# Patient Record
Sex: FEMALE | Race: WHITE | NOT HISPANIC OR LATINO
[De-identification: names, ages, dates, MRNs, and addresses within clinical notes are randomized per-mention and may not be internally consistent; named-entity substitution may affect disease eponyms.]

---

## 2017-02-28 ENCOUNTER — APPOINTMENT (OUTPATIENT)
Dept: ORTHOPEDIC SURGERY | Facility: CLINIC | Age: 72
End: 2017-02-28

## 2017-02-28 VITALS
BODY MASS INDEX: 28 KG/M2 | DIASTOLIC BLOOD PRESSURE: 75 MMHG | WEIGHT: 164 LBS | SYSTOLIC BLOOD PRESSURE: 145 MMHG | HEIGHT: 64 IN | HEART RATE: 88 BPM

## 2017-02-28 DIAGNOSIS — Z78.9 OTHER SPECIFIED HEALTH STATUS: ICD-10-CM

## 2017-02-28 DIAGNOSIS — E78.00 PURE HYPERCHOLESTEROLEMIA, UNSPECIFIED: ICD-10-CM

## 2017-02-28 DIAGNOSIS — Z87.39 PERSONAL HISTORY OF OTHER DISEASES OF THE MUSCULOSKELETAL SYSTEM AND CONNECTIVE TISSUE: ICD-10-CM

## 2017-02-28 DIAGNOSIS — Z85.89 PERSONAL HISTORY OF MALIGNANT NEOPLASM OF OTHER ORGANS AND SYSTEMS: ICD-10-CM

## 2017-02-28 DIAGNOSIS — Z86.79 PERSONAL HISTORY OF OTHER DISEASES OF THE CIRCULATORY SYSTEM: ICD-10-CM

## 2017-02-28 DIAGNOSIS — Z85.828 PERSONAL HISTORY OF OTHER MALIGNANT NEOPLASM OF SKIN: ICD-10-CM

## 2017-02-28 DIAGNOSIS — Z82.61 FAMILY HISTORY OF ARTHRITIS: ICD-10-CM

## 2017-02-28 RX ORDER — BIMATOPROST 0.1 MG/ML
SOLUTION/ DROPS OPHTHALMIC
Refills: 0 | Status: ACTIVE | COMMUNITY

## 2017-02-28 RX ORDER — AMLODIPINE BESYLATE AND BENAZEPRIL HYDROCHLORIDE 5; 20 MG/1; MG/1
5-20 CAPSULE ORAL
Refills: 0 | Status: ACTIVE | COMMUNITY

## 2017-03-17 ENCOUNTER — APPOINTMENT (OUTPATIENT)
Dept: ORTHOPEDIC SURGERY | Facility: CLINIC | Age: 72
End: 2017-03-17

## 2017-03-17 VITALS
HEIGHT: 64 IN | DIASTOLIC BLOOD PRESSURE: 76 MMHG | SYSTOLIC BLOOD PRESSURE: 153 MMHG | HEART RATE: 83 BPM | BODY MASS INDEX: 28 KG/M2 | WEIGHT: 164 LBS

## 2017-03-28 ENCOUNTER — OUTPATIENT (OUTPATIENT)
Dept: OUTPATIENT SERVICES | Facility: HOSPITAL | Age: 72
LOS: 1 days | End: 2017-03-28
Payer: MEDICARE

## 2017-03-28 VITALS
SYSTOLIC BLOOD PRESSURE: 140 MMHG | TEMPERATURE: 97 F | HEIGHT: 63 IN | RESPIRATION RATE: 16 BRPM | HEART RATE: 84 BPM | WEIGHT: 160.06 LBS | DIASTOLIC BLOOD PRESSURE: 80 MMHG

## 2017-03-28 DIAGNOSIS — T78.40XA ALLERGY, UNSPECIFIED, INITIAL ENCOUNTER: ICD-10-CM

## 2017-03-28 DIAGNOSIS — M16.11 UNILATERAL PRIMARY OSTEOARTHRITIS, RIGHT HIP: ICD-10-CM

## 2017-03-28 DIAGNOSIS — G47.33 OBSTRUCTIVE SLEEP APNEA (ADULT) (PEDIATRIC): ICD-10-CM

## 2017-03-28 DIAGNOSIS — Z98.890 OTHER SPECIFIED POSTPROCEDURAL STATES: Chronic | ICD-10-CM

## 2017-03-28 DIAGNOSIS — I10 ESSENTIAL (PRIMARY) HYPERTENSION: ICD-10-CM

## 2017-03-28 DIAGNOSIS — Z85.828 PERSONAL HISTORY OF OTHER MALIGNANT NEOPLASM OF SKIN: Chronic | ICD-10-CM

## 2017-03-28 DIAGNOSIS — H26.9 UNSPECIFIED CATARACT: Chronic | ICD-10-CM

## 2017-03-28 LAB
APPEARANCE UR: CLEAR — SIGNIFICANT CHANGE UP
APTT BLD: 32.7 SEC — SIGNIFICANT CHANGE UP (ref 27.5–37.4)
BACTERIA # UR AUTO: HIGH
BASOPHILS # BLD AUTO: 0.02 K/UL — SIGNIFICANT CHANGE UP (ref 0–0.2)
BASOPHILS NFR BLD AUTO: 0.3 % — SIGNIFICANT CHANGE UP (ref 0–2)
BILIRUB UR-MCNC: NEGATIVE — SIGNIFICANT CHANGE UP
BLD GP AB SCN SERPL QL: NEGATIVE — SIGNIFICANT CHANGE UP
BLOOD UR QL VISUAL: HIGH
BUN SERPL-MCNC: 15 MG/DL — SIGNIFICANT CHANGE UP (ref 7–23)
CALCIUM SERPL-MCNC: 10.1 MG/DL — SIGNIFICANT CHANGE UP (ref 8.4–10.5)
CHLORIDE SERPL-SCNC: 99 MMOL/L — SIGNIFICANT CHANGE UP (ref 98–107)
CO2 SERPL-SCNC: 25 MMOL/L — SIGNIFICANT CHANGE UP (ref 22–31)
COLOR SPEC: YELLOW — SIGNIFICANT CHANGE UP
CREAT SERPL-MCNC: 0.87 MG/DL — SIGNIFICANT CHANGE UP (ref 0.5–1.3)
EOSINOPHIL # BLD AUTO: 0.08 K/UL — SIGNIFICANT CHANGE UP (ref 0–0.5)
EOSINOPHIL NFR BLD AUTO: 1.4 % — SIGNIFICANT CHANGE UP (ref 0–6)
GLUCOSE SERPL-MCNC: 91 MG/DL — SIGNIFICANT CHANGE UP (ref 70–99)
GLUCOSE UR-MCNC: NEGATIVE — SIGNIFICANT CHANGE UP
HCT VFR BLD CALC: 39.7 % — SIGNIFICANT CHANGE UP (ref 34.5–45)
HGB BLD-MCNC: 13.1 G/DL — SIGNIFICANT CHANGE UP (ref 11.5–15.5)
IMM GRANULOCYTES NFR BLD AUTO: 0.2 % — SIGNIFICANT CHANGE UP (ref 0–1.5)
INR BLD: 0.96 — SIGNIFICANT CHANGE UP (ref 0.88–1.17)
KETONES UR-MCNC: NEGATIVE — SIGNIFICANT CHANGE UP
LEUKOCYTE ESTERASE UR-ACNC: HIGH
LYMPHOCYTES # BLD AUTO: 1.67 K/UL — SIGNIFICANT CHANGE UP (ref 1–3.3)
LYMPHOCYTES # BLD AUTO: 29.1 % — SIGNIFICANT CHANGE UP (ref 13–44)
MCHC RBC-ENTMCNC: 30.4 PG — SIGNIFICANT CHANGE UP (ref 27–34)
MCHC RBC-ENTMCNC: 33 % — SIGNIFICANT CHANGE UP (ref 32–36)
MCV RBC AUTO: 92.1 FL — SIGNIFICANT CHANGE UP (ref 80–100)
MONOCYTES # BLD AUTO: 0.4 K/UL — SIGNIFICANT CHANGE UP (ref 0–0.9)
MONOCYTES NFR BLD AUTO: 7 % — SIGNIFICANT CHANGE UP (ref 2–14)
MUCOUS THREADS # UR AUTO: SIGNIFICANT CHANGE UP
NEUTROPHILS # BLD AUTO: 3.55 K/UL — SIGNIFICANT CHANGE UP (ref 1.8–7.4)
NEUTROPHILS NFR BLD AUTO: 62 % — SIGNIFICANT CHANGE UP (ref 43–77)
NITRITE UR-MCNC: POSITIVE — HIGH
NON-SQ EPI CELLS # UR AUTO: <1 — SIGNIFICANT CHANGE UP
PH UR: 5.5 — SIGNIFICANT CHANGE UP (ref 4.6–8)
PLATELET # BLD AUTO: 272 K/UL — SIGNIFICANT CHANGE UP (ref 150–400)
PMV BLD: 10.2 FL — SIGNIFICANT CHANGE UP (ref 7–13)
POTASSIUM SERPL-MCNC: 4.3 MMOL/L — SIGNIFICANT CHANGE UP (ref 3.5–5.3)
POTASSIUM SERPL-SCNC: 4.3 MMOL/L — SIGNIFICANT CHANGE UP (ref 3.5–5.3)
PROT UR-MCNC: 10 — SIGNIFICANT CHANGE UP
PROTHROM AB SERPL-ACNC: 10.7 SEC — SIGNIFICANT CHANGE UP (ref 9.8–13.1)
RBC # BLD: 4.31 M/UL — SIGNIFICANT CHANGE UP (ref 3.8–5.2)
RBC # FLD: 12.6 % — SIGNIFICANT CHANGE UP (ref 10.3–14.5)
RBC CASTS # UR COMP ASSIST: HIGH (ref 0–?)
RH IG SCN BLD-IMP: POSITIVE — SIGNIFICANT CHANGE UP
SODIUM SERPL-SCNC: 142 MMOL/L — SIGNIFICANT CHANGE UP (ref 135–145)
SP GR SPEC: 1.02 — SIGNIFICANT CHANGE UP (ref 1–1.03)
SQUAMOUS # UR AUTO: SIGNIFICANT CHANGE UP
UROBILINOGEN FLD QL: NORMAL E.U. — SIGNIFICANT CHANGE UP (ref 0.1–0.2)
WBC # BLD: 5.73 K/UL — SIGNIFICANT CHANGE UP (ref 3.8–10.5)
WBC # FLD AUTO: 5.73 K/UL — SIGNIFICANT CHANGE UP (ref 3.8–10.5)
WBC UR QL: SIGNIFICANT CHANGE UP (ref 0–?)

## 2017-03-28 PROCEDURE — 93010 ELECTROCARDIOGRAM REPORT: CPT

## 2017-03-28 RX ORDER — PANTOPRAZOLE SODIUM 20 MG/1
40 TABLET, DELAYED RELEASE ORAL ONCE
Qty: 0 | Refills: 0 | Status: COMPLETED | OUTPATIENT
Start: 2017-04-10 | End: 2017-04-10

## 2017-03-28 RX ORDER — ACETAMINOPHEN 500 MG
975 TABLET ORAL ONCE
Qty: 0 | Refills: 0 | Status: COMPLETED | OUTPATIENT
Start: 2017-04-10 | End: 2017-04-10

## 2017-03-28 RX ORDER — GABAPENTIN 400 MG/1
100 CAPSULE ORAL ONCE
Qty: 0 | Refills: 0 | Status: COMPLETED | OUTPATIENT
Start: 2017-04-10 | End: 2017-04-10

## 2017-03-28 RX ORDER — TRAMADOL HYDROCHLORIDE 50 MG/1
50 TABLET ORAL ONCE
Qty: 0 | Refills: 0 | Status: DISCONTINUED | OUTPATIENT
Start: 2017-04-10 | End: 2017-04-10

## 2017-03-28 NOTE — H&P PST ADULT - PSH
Cataract  s/p cataract surgery (2016)  History of lymph node dissection of left axilla  2012  History of skin cancer  s/p melanomas removal 2007

## 2017-03-28 NOTE — H&P PST ADULT - PMH
Cataract  s/p surgery 2016  Glaucoma    Hyperlipidemia    Hypertension    Lymph node cancer  s/p lymph node dissection & chemotherapy (2012)  Skin cancer

## 2017-03-28 NOTE — H&P PST ADULT - PROBLEM SELECTOR PLAN 2
Instructed to take amlodipine AM of surgery with a sip of water. Patient is scheduled for medical clearance. Will obtain. Instructed to take amlodipine/benazepril AM of surgery with a sip of water. Patient is scheduled for medical clearance. Will obtain.

## 2017-03-28 NOTE — H&P PST ADULT - PROBLEM SELECTOR PLAN 1
Scheduled for Scheduled for right total hip replacement direct anterior approach on 4/10/2017. labs done and results pending. Surgical scrub & preop instruction given and explained. Famotidine provided with instruction. Verbalized understanding.

## 2017-03-28 NOTE — H&P PST ADULT - NSANTHOSAYNRD_GEN_A_CORE
No. KATHARINA screening performed.  STOP BANG Legend: 0-2 = LOW Risk; 3-4 = INTERMEDIATE Risk; 5-8 = HIGH Risk

## 2017-03-28 NOTE — H&P PST ADULT - HISTORY OF PRESENT ILLNESS
72 yo female with hx of 72 yo female with hx of HTN, HLD, skin caner, lymph node cancer (s/p lymph node dissection & chemotherapy 2012) presents to have PST evaluation for right total hip replacement on. Patient c/o bilateral hip pain x years, had multiple injections to relieve pain, but stating pain got significantly worse over 4 months, went for surgical evaluation, had x-ray done, which revealed "bone on bone". 72 yo female with hx of HTN, HLD, skin caner, lymph node cancer (s/p lymph node dissection & chemotherapy 2012) presents to have PST evaluation c/o bilateral hip pain x years. Patient states, she's had multiple injections to relieve pain, but pain got significantly worse over last 4 months, went for an evaluation, had x-ray done, which revealed "bone on bone", now scheduled for right total hip replacement direct anterior approach on 4/10/2017.

## 2017-03-28 NOTE — H&P PST ADULT - NEGATIVE OPHTHALMOLOGIC SYMPTOMS
no photophobia/no lacrimation R/no diplopia/no lacrimation L/no blurred vision R/no blurred vision L

## 2017-03-28 NOTE — H&P PST ADULT - FAMILY HISTORY
Grandparent  Still living? No  Family history of diabetes mellitus, Age at diagnosis: Age Unknown     Father  Still living? No  Family history of heart disease, Age at diagnosis: Age Unknown     Mother  Still living? No  Family history of heart disease, Age at diagnosis: Age Unknown

## 2017-03-28 NOTE — H&P PST ADULT - RS GEN PE MLT RESP DETAILS PC
breath sounds equal/no rales/good air movement/clear to auscultation bilaterally/no rhonchi/no wheezes/respirations non-labored/airway patent

## 2017-03-29 LAB
SPECIMEN SOURCE: SIGNIFICANT CHANGE UP
SPECIMEN SOURCE: SIGNIFICANT CHANGE UP

## 2017-03-30 LAB — BACTERIA NPH CULT: SIGNIFICANT CHANGE UP

## 2017-04-03 ENCOUNTER — RX RENEWAL (OUTPATIENT)
Age: 72
End: 2017-04-03

## 2017-04-03 LAB
-  AMIKACIN: SIGNIFICANT CHANGE UP
-  AMPICILLIN/SULBACTAM: SIGNIFICANT CHANGE UP
-  AMPICILLIN: SIGNIFICANT CHANGE UP
-  AZTREONAM: SIGNIFICANT CHANGE UP
-  CEFAZOLIN: SIGNIFICANT CHANGE UP
-  CEFEPIME: SIGNIFICANT CHANGE UP
-  CEFOXITIN: SIGNIFICANT CHANGE UP
-  CEFTAZIDIME: SIGNIFICANT CHANGE UP
-  CEFTRIAXONE: SIGNIFICANT CHANGE UP
-  CIPROFLOXACIN: SIGNIFICANT CHANGE UP
-  ERTAPENEM: SIGNIFICANT CHANGE UP
-  GENTAMICIN: SIGNIFICANT CHANGE UP
-  IMIPENEM: SIGNIFICANT CHANGE UP
-  LEVOFLOXACIN: SIGNIFICANT CHANGE UP
-  MEROPENEM: SIGNIFICANT CHANGE UP
-  NITROFURANTOIN: SIGNIFICANT CHANGE UP
-  PIPERACILLIN/TAZOBACTAM: SIGNIFICANT CHANGE UP
-  TIGECYCLINE: SIGNIFICANT CHANGE UP
-  TOBRAMYCIN: SIGNIFICANT CHANGE UP
-  TRIMETHOPRIM/SULFAMETHOXAZOLE: SIGNIFICANT CHANGE UP
BACTERIA UR CULT: SIGNIFICANT CHANGE UP
METHOD TYPE: SIGNIFICANT CHANGE UP
ORGANISM # SPEC MICROSCOPIC CNT: SIGNIFICANT CHANGE UP
ORGANISM # SPEC MICROSCOPIC CNT: SIGNIFICANT CHANGE UP

## 2017-04-05 ENCOUNTER — OTHER (OUTPATIENT)
Age: 72
End: 2017-04-05

## 2017-04-09 ENCOUNTER — RESULT REVIEW (OUTPATIENT)
Age: 72
End: 2017-04-09

## 2017-04-10 ENCOUNTER — APPOINTMENT (OUTPATIENT)
Dept: ORTHOPEDIC SURGERY | Facility: HOSPITAL | Age: 72
End: 2017-04-10

## 2017-04-10 ENCOUNTER — INPATIENT (INPATIENT)
Facility: HOSPITAL | Age: 72
LOS: 0 days | Discharge: HOME HEALTH | End: 2017-04-11
Attending: ORTHOPAEDIC SURGERY | Admitting: ORTHOPAEDIC SURGERY
Payer: MEDICARE

## 2017-04-10 VITALS
DIASTOLIC BLOOD PRESSURE: 75 MMHG | OXYGEN SATURATION: 98 % | SYSTOLIC BLOOD PRESSURE: 126 MMHG | HEART RATE: 85 BPM | HEIGHT: 63 IN | TEMPERATURE: 98 F | RESPIRATION RATE: 16 BRPM | WEIGHT: 160.06 LBS

## 2017-04-10 DIAGNOSIS — H26.9 UNSPECIFIED CATARACT: Chronic | ICD-10-CM

## 2017-04-10 DIAGNOSIS — M16.11 UNILATERAL PRIMARY OSTEOARTHRITIS, RIGHT HIP: ICD-10-CM

## 2017-04-10 DIAGNOSIS — Z85.828 PERSONAL HISTORY OF OTHER MALIGNANT NEOPLASM OF SKIN: Chronic | ICD-10-CM

## 2017-04-10 DIAGNOSIS — Z98.890 OTHER SPECIFIED POSTPROCEDURAL STATES: Chronic | ICD-10-CM

## 2017-04-10 LAB
BUN SERPL-MCNC: 17 MG/DL — SIGNIFICANT CHANGE UP (ref 7–23)
CALCIUM SERPL-MCNC: 9.5 MG/DL — SIGNIFICANT CHANGE UP (ref 8.4–10.5)
CHLORIDE SERPL-SCNC: 102 MMOL/L — SIGNIFICANT CHANGE UP (ref 98–107)
CO2 SERPL-SCNC: 24 MMOL/L — SIGNIFICANT CHANGE UP (ref 22–31)
CREAT SERPL-MCNC: 0.85 MG/DL — SIGNIFICANT CHANGE UP (ref 0.5–1.3)
GLUCOSE SERPL-MCNC: 142 MG/DL — HIGH (ref 70–99)
HCT VFR BLD CALC: 35.7 % — SIGNIFICANT CHANGE UP (ref 34.5–45)
HGB BLD-MCNC: 11.7 G/DL — SIGNIFICANT CHANGE UP (ref 11.5–15.5)
MCHC RBC-ENTMCNC: 29.8 PG — SIGNIFICANT CHANGE UP (ref 27–34)
MCHC RBC-ENTMCNC: 32.8 % — SIGNIFICANT CHANGE UP (ref 32–36)
MCV RBC AUTO: 91.1 FL — SIGNIFICANT CHANGE UP (ref 80–100)
PLATELET # BLD AUTO: 194 K/UL — SIGNIFICANT CHANGE UP (ref 150–400)
PMV BLD: 9.1 FL — SIGNIFICANT CHANGE UP (ref 7–13)
POTASSIUM SERPL-MCNC: 4.4 MMOL/L — SIGNIFICANT CHANGE UP (ref 3.5–5.3)
POTASSIUM SERPL-SCNC: 4.4 MMOL/L — SIGNIFICANT CHANGE UP (ref 3.5–5.3)
RBC # BLD: 3.92 M/UL — SIGNIFICANT CHANGE UP (ref 3.8–5.2)
RBC # FLD: 12.9 % — SIGNIFICANT CHANGE UP (ref 10.3–14.5)
RH IG SCN BLD-IMP: POSITIVE — SIGNIFICANT CHANGE UP
SODIUM SERPL-SCNC: 141 MMOL/L — SIGNIFICANT CHANGE UP (ref 135–145)
WBC # BLD: 8.48 K/UL — SIGNIFICANT CHANGE UP (ref 3.8–10.5)
WBC # FLD AUTO: 8.48 K/UL — SIGNIFICANT CHANGE UP (ref 3.8–10.5)

## 2017-04-10 PROCEDURE — 27130 TOTAL HIP ARTHROPLASTY: CPT | Mod: RT

## 2017-04-10 PROCEDURE — 88304 TISSUE EXAM BY PATHOLOGIST: CPT | Mod: 26

## 2017-04-10 PROCEDURE — 88311 DECALCIFY TISSUE: CPT | Mod: 26

## 2017-04-10 PROCEDURE — 73501 X-RAY EXAM HIP UNI 1 VIEW: CPT | Mod: 26,RT

## 2017-04-10 RX ORDER — SENNA PLUS 8.6 MG/1
2 TABLET ORAL AT BEDTIME
Qty: 0 | Refills: 0 | Status: DISCONTINUED | OUTPATIENT
Start: 2017-04-10 | End: 2017-04-11

## 2017-04-10 RX ORDER — SODIUM CHLORIDE 9 MG/ML
1000 INJECTION, SOLUTION INTRAVENOUS
Qty: 0 | Refills: 0 | Status: DISCONTINUED | OUTPATIENT
Start: 2017-04-10 | End: 2017-04-11

## 2017-04-10 RX ORDER — POLYETHYLENE GLYCOL 3350 17 G/17G
17 POWDER, FOR SOLUTION ORAL DAILY
Qty: 0 | Refills: 0 | Status: DISCONTINUED | OUTPATIENT
Start: 2017-04-10 | End: 2017-04-11

## 2017-04-10 RX ORDER — OXYCODONE HYDROCHLORIDE 5 MG/1
5 TABLET ORAL EVERY 4 HOURS
Qty: 0 | Refills: 0 | Status: DISCONTINUED | OUTPATIENT
Start: 2017-04-10 | End: 2017-04-11

## 2017-04-10 RX ORDER — DOCUSATE SODIUM 100 MG
100 CAPSULE ORAL THREE TIMES A DAY
Qty: 0 | Refills: 0 | Status: DISCONTINUED | OUTPATIENT
Start: 2017-04-10 | End: 2017-04-11

## 2017-04-10 RX ORDER — SODIUM CHLORIDE 9 MG/ML
1000 INJECTION INTRAMUSCULAR; INTRAVENOUS; SUBCUTANEOUS ONCE
Qty: 0 | Refills: 0 | Status: COMPLETED | OUTPATIENT
Start: 2017-04-11 | End: 2017-04-11

## 2017-04-10 RX ORDER — DEXAMETHASONE 0.5 MG/5ML
10 ELIXIR ORAL ONCE
Qty: 0 | Refills: 0 | Status: COMPLETED | OUTPATIENT
Start: 2017-04-11 | End: 2017-04-11

## 2017-04-10 RX ORDER — CELECOXIB 200 MG/1
200 CAPSULE ORAL
Qty: 0 | Refills: 0 | Status: DISCONTINUED | OUTPATIENT
Start: 2017-04-12 | End: 2017-04-11

## 2017-04-10 RX ORDER — SODIUM CHLORIDE 9 MG/ML
3 INJECTION INTRAMUSCULAR; INTRAVENOUS; SUBCUTANEOUS EVERY 8 HOURS
Qty: 0 | Refills: 0 | Status: DISCONTINUED | OUTPATIENT
Start: 2017-04-10 | End: 2017-04-10

## 2017-04-10 RX ORDER — LATANOPROST 0.05 MG/ML
1 SOLUTION/ DROPS OPHTHALMIC; TOPICAL AT BEDTIME
Qty: 0 | Refills: 0 | Status: DISCONTINUED | OUTPATIENT
Start: 2017-04-10 | End: 2017-04-11

## 2017-04-10 RX ORDER — OXYCODONE HYDROCHLORIDE 5 MG/1
10 TABLET ORAL EVERY 4 HOURS
Qty: 0 | Refills: 0 | Status: DISCONTINUED | OUTPATIENT
Start: 2017-04-10 | End: 2017-04-11

## 2017-04-10 RX ORDER — ASPIRIN/CALCIUM CARB/MAGNESIUM 324 MG
325 TABLET ORAL
Qty: 0 | Refills: 0 | Status: DISCONTINUED | OUTPATIENT
Start: 2017-04-10 | End: 2017-04-11

## 2017-04-10 RX ORDER — ACETAMINOPHEN 500 MG
650 TABLET ORAL EVERY 6 HOURS
Qty: 0 | Refills: 0 | Status: DISCONTINUED | OUTPATIENT
Start: 2017-04-10 | End: 2017-04-11

## 2017-04-10 RX ORDER — SODIUM CHLORIDE 0.65 %
1 AEROSOL, SPRAY (ML) NASAL
Qty: 0 | Refills: 0 | Status: DISCONTINUED | OUTPATIENT
Start: 2017-04-10 | End: 2017-04-11

## 2017-04-10 RX ORDER — CEFAZOLIN SODIUM 1 G
2000 VIAL (EA) INJECTION EVERY 8 HOURS
Qty: 0 | Refills: 0 | Status: COMPLETED | OUTPATIENT
Start: 2017-04-10 | End: 2017-04-10

## 2017-04-10 RX ORDER — LISINOPRIL 2.5 MG/1
20 TABLET ORAL DAILY
Qty: 0 | Refills: 0 | Status: DISCONTINUED | OUTPATIENT
Start: 2017-04-10 | End: 2017-04-11

## 2017-04-10 RX ORDER — SODIUM CHLORIDE 9 MG/ML
1000 INJECTION, SOLUTION INTRAVENOUS
Qty: 0 | Refills: 0 | Status: DISCONTINUED | OUTPATIENT
Start: 2017-04-10 | End: 2017-04-10

## 2017-04-10 RX ORDER — ONDANSETRON 8 MG/1
4 TABLET, FILM COATED ORAL EVERY 6 HOURS
Qty: 0 | Refills: 0 | Status: DISCONTINUED | OUTPATIENT
Start: 2017-04-10 | End: 2017-04-11

## 2017-04-10 RX ORDER — ATORVASTATIN CALCIUM 80 MG/1
10 TABLET, FILM COATED ORAL AT BEDTIME
Qty: 0 | Refills: 0 | Status: DISCONTINUED | OUTPATIENT
Start: 2017-04-10 | End: 2017-04-11

## 2017-04-10 RX ORDER — AMLODIPINE BESYLATE 2.5 MG/1
5 TABLET ORAL DAILY
Qty: 0 | Refills: 0 | Status: DISCONTINUED | OUTPATIENT
Start: 2017-04-10 | End: 2017-04-11

## 2017-04-10 RX ORDER — TRAMADOL HYDROCHLORIDE 50 MG/1
50 TABLET ORAL EVERY 8 HOURS
Qty: 0 | Refills: 0 | Status: DISCONTINUED | OUTPATIENT
Start: 2017-04-10 | End: 2017-04-11

## 2017-04-10 RX ORDER — PANTOPRAZOLE SODIUM 20 MG/1
40 TABLET, DELAYED RELEASE ORAL DAILY
Qty: 0 | Refills: 0 | Status: DISCONTINUED | OUTPATIENT
Start: 2017-04-10 | End: 2017-04-11

## 2017-04-10 RX ORDER — HYDROMORPHONE HYDROCHLORIDE 2 MG/ML
0.5 INJECTION INTRAMUSCULAR; INTRAVENOUS; SUBCUTANEOUS EVERY 4 HOURS
Qty: 0 | Refills: 0 | Status: DISCONTINUED | OUTPATIENT
Start: 2017-04-10 | End: 2017-04-11

## 2017-04-10 RX ORDER — SODIUM CHLORIDE 9 MG/ML
1000 INJECTION INTRAMUSCULAR; INTRAVENOUS; SUBCUTANEOUS ONCE
Qty: 0 | Refills: 0 | Status: COMPLETED | OUTPATIENT
Start: 2017-04-10 | End: 2017-04-10

## 2017-04-10 RX ORDER — KETOROLAC TROMETHAMINE 30 MG/ML
15 SYRINGE (ML) INJECTION EVERY 8 HOURS
Qty: 0 | Refills: 0 | Status: DISCONTINUED | OUTPATIENT
Start: 2017-04-10 | End: 2017-04-11

## 2017-04-10 RX ADMIN — Medication 325 MILLIGRAM(S): at 19:39

## 2017-04-10 RX ADMIN — SENNA PLUS 2 TABLET(S): 8.6 TABLET ORAL at 22:16

## 2017-04-10 RX ADMIN — Medication 650 MILLIGRAM(S): at 19:39

## 2017-04-10 RX ADMIN — GABAPENTIN 100 MILLIGRAM(S): 400 CAPSULE ORAL at 06:19

## 2017-04-10 RX ADMIN — TRAMADOL HYDROCHLORIDE 50 MILLIGRAM(S): 50 TABLET ORAL at 22:16

## 2017-04-10 RX ADMIN — TRAMADOL HYDROCHLORIDE 50 MILLIGRAM(S): 50 TABLET ORAL at 06:22

## 2017-04-10 RX ADMIN — PANTOPRAZOLE SODIUM 40 MILLIGRAM(S): 20 TABLET, DELAYED RELEASE ORAL at 06:20

## 2017-04-10 RX ADMIN — Medication 100 MILLIGRAM(S): at 16:53

## 2017-04-10 RX ADMIN — SODIUM CHLORIDE 1000 MILLILITER(S): 9 INJECTION INTRAMUSCULAR; INTRAVENOUS; SUBCUTANEOUS at 13:34

## 2017-04-10 RX ADMIN — Medication 100 MILLIGRAM(S): at 22:16

## 2017-04-10 RX ADMIN — ONDANSETRON 4 MILLIGRAM(S): 8 TABLET, FILM COATED ORAL at 17:53

## 2017-04-10 RX ADMIN — Medication 15 MILLIGRAM(S): at 22:16

## 2017-04-10 RX ADMIN — Medication 15 MILLIGRAM(S): at 14:10

## 2017-04-10 RX ADMIN — TRAMADOL HYDROCHLORIDE 50 MILLIGRAM(S): 50 TABLET ORAL at 14:09

## 2017-04-10 RX ADMIN — Medication 100 MILLIGRAM(S): at 14:09

## 2017-04-10 RX ADMIN — Medication 100 MILLIGRAM(S): at 23:15

## 2017-04-10 RX ADMIN — Medication 975 MILLIGRAM(S): at 06:21

## 2017-04-10 RX ADMIN — POLYETHYLENE GLYCOL 3350 17 GRAM(S): 17 POWDER, FOR SOLUTION ORAL at 14:12

## 2017-04-10 RX ADMIN — SODIUM CHLORIDE 150 MILLILITER(S): 9 INJECTION, SOLUTION INTRAVENOUS at 10:38

## 2017-04-10 RX ADMIN — Medication 650 MILLIGRAM(S): at 12:20

## 2017-04-10 RX ADMIN — Medication 650 MILLIGRAM(S): at 23:18

## 2017-04-10 NOTE — PHYSICAL THERAPY INITIAL EVALUATION ADULT - ACTIVE RANGE OF MOTION EXAMINATION, REHAB EVAL
bilateral  lower extremity Active ROM was WFL (within functional limits)/bilateral upper extremity Active ROM was WFL (within functional limits)/except R hip flexion 0- 45 degrees

## 2017-04-10 NOTE — PACU DISCHARGE NOTE - COMMENTS
Transfer patient to post surgical floor on continuous pulse oximetry monitoring after meeting PACU criteria.

## 2017-04-10 NOTE — ASU PREOP CHECKLIST - COMMENTS
Sip of water at 4:30 am with amlodipine and atorvasatin and pepcid Sip of water at 4:30 am with amlodipine and atorvastatin and pepcid Sip of water at 4:30 am with amlodipine and atorvastatin and Pepcid

## 2017-04-10 NOTE — ASU PREOP CHECKLIST - SELECT TESTS ORDERED
CBC/Type and Screen/Type and Cross/INR/EKG/CMP/PT/PTT PT/PTT/Type and Screen/CBC/Type and Cross/EKG/nasal swab negative/CMP/INR

## 2017-04-11 ENCOUNTER — TRANSCRIPTION ENCOUNTER (OUTPATIENT)
Age: 72
End: 2017-04-11

## 2017-04-11 VITALS
OXYGEN SATURATION: 98 % | DIASTOLIC BLOOD PRESSURE: 74 MMHG | SYSTOLIC BLOOD PRESSURE: 151 MMHG | TEMPERATURE: 99 F | RESPIRATION RATE: 17 BRPM | HEART RATE: 82 BPM

## 2017-04-11 LAB
BUN SERPL-MCNC: 14 MG/DL — SIGNIFICANT CHANGE UP (ref 7–23)
CALCIUM SERPL-MCNC: 7.9 MG/DL — LOW (ref 8.4–10.5)
CHLORIDE SERPL-SCNC: 107 MMOL/L — SIGNIFICANT CHANGE UP (ref 98–107)
CO2 SERPL-SCNC: 21 MMOL/L — LOW (ref 22–31)
CREAT SERPL-MCNC: 0.68 MG/DL — SIGNIFICANT CHANGE UP (ref 0.5–1.3)
GLUCOSE SERPL-MCNC: 152 MG/DL — HIGH (ref 70–99)
HCT VFR BLD CALC: 28.3 % — LOW (ref 34.5–45)
HGB BLD-MCNC: 9.6 G/DL — LOW (ref 11.5–15.5)
MCHC RBC-ENTMCNC: 30.5 PG — SIGNIFICANT CHANGE UP (ref 27–34)
MCHC RBC-ENTMCNC: 33.9 % — SIGNIFICANT CHANGE UP (ref 32–36)
MCV RBC AUTO: 89.8 FL — SIGNIFICANT CHANGE UP (ref 80–100)
PLATELET # BLD AUTO: 177 K/UL — SIGNIFICANT CHANGE UP (ref 150–400)
PMV BLD: 9.3 FL — SIGNIFICANT CHANGE UP (ref 7–13)
POTASSIUM SERPL-MCNC: 4.1 MMOL/L — SIGNIFICANT CHANGE UP (ref 3.5–5.3)
POTASSIUM SERPL-SCNC: 4.1 MMOL/L — SIGNIFICANT CHANGE UP (ref 3.5–5.3)
RBC # BLD: 3.15 M/UL — LOW (ref 3.8–5.2)
RBC # FLD: 12.9 % — SIGNIFICANT CHANGE UP (ref 10.3–14.5)
SODIUM SERPL-SCNC: 142 MMOL/L — SIGNIFICANT CHANGE UP (ref 135–145)
WBC # BLD: 8.13 K/UL — SIGNIFICANT CHANGE UP (ref 3.8–10.5)
WBC # FLD AUTO: 8.13 K/UL — SIGNIFICANT CHANGE UP (ref 3.8–10.5)

## 2017-04-11 PROCEDURE — 99223 1ST HOSP IP/OBS HIGH 75: CPT | Mod: AI

## 2017-04-11 RX ORDER — ASPIRIN/CALCIUM CARB/MAGNESIUM 324 MG
1 TABLET ORAL
Qty: 90 | Refills: 0 | OUTPATIENT
Start: 2017-04-11 | End: 2017-05-26

## 2017-04-11 RX ORDER — TRAMADOL HYDROCHLORIDE 50 MG/1
0 TABLET ORAL
Qty: 0 | Refills: 0 | COMMUNITY

## 2017-04-11 RX ORDER — SENNA PLUS 8.6 MG/1
2 TABLET ORAL
Qty: 10 | Refills: 0 | OUTPATIENT
Start: 2017-04-11 | End: 2017-04-16

## 2017-04-11 RX ORDER — BACITRACIN ZINC 500 UNIT/G
1 OINTMENT IN PACKET (EA) TOPICAL ONCE
Qty: 0 | Refills: 0 | Status: COMPLETED | OUTPATIENT
Start: 2017-04-11 | End: 2017-04-11

## 2017-04-11 RX ORDER — ACETAMINOPHEN 500 MG
0 TABLET ORAL
Qty: 0 | Refills: 0 | COMMUNITY

## 2017-04-11 RX ORDER — TRAMADOL HYDROCHLORIDE 50 MG/1
1 TABLET ORAL
Qty: 21 | Refills: 0 | OUTPATIENT
Start: 2017-04-11 | End: 2017-04-18

## 2017-04-11 RX ORDER — DOCUSATE SODIUM 100 MG
1 CAPSULE ORAL
Qty: 0 | Refills: 0 | COMMUNITY
Start: 2017-04-11

## 2017-04-11 RX ADMIN — SODIUM CHLORIDE 150 MILLILITER(S): 9 INJECTION, SOLUTION INTRAVENOUS at 06:39

## 2017-04-11 RX ADMIN — Medication 650 MILLIGRAM(S): at 11:52

## 2017-04-11 RX ADMIN — Medication 102 MILLIGRAM(S): at 04:12

## 2017-04-11 RX ADMIN — Medication 325 MILLIGRAM(S): at 17:38

## 2017-04-11 RX ADMIN — SODIUM CHLORIDE 1000 MILLILITER(S): 9 INJECTION INTRAMUSCULAR; INTRAVENOUS; SUBCUTANEOUS at 05:49

## 2017-04-11 RX ADMIN — TRAMADOL HYDROCHLORIDE 50 MILLIGRAM(S): 50 TABLET ORAL at 13:49

## 2017-04-11 RX ADMIN — PANTOPRAZOLE SODIUM 40 MILLIGRAM(S): 20 TABLET, DELAYED RELEASE ORAL at 11:51

## 2017-04-11 RX ADMIN — Medication 15 MILLIGRAM(S): at 05:48

## 2017-04-11 RX ADMIN — Medication 100 MILLIGRAM(S): at 13:49

## 2017-04-11 RX ADMIN — Medication 1 APPLICATION(S): at 17:07

## 2017-04-11 RX ADMIN — OXYCODONE HYDROCHLORIDE 10 MILLIGRAM(S): 5 TABLET ORAL at 09:31

## 2017-04-11 RX ADMIN — LISINOPRIL 20 MILLIGRAM(S): 2.5 TABLET ORAL at 05:49

## 2017-04-11 RX ADMIN — Medication 325 MILLIGRAM(S): at 05:48

## 2017-04-11 RX ADMIN — OXYCODONE HYDROCHLORIDE 10 MILLIGRAM(S): 5 TABLET ORAL at 10:14

## 2017-04-11 RX ADMIN — Medication 100 MILLIGRAM(S): at 05:49

## 2017-04-11 RX ADMIN — AMLODIPINE BESYLATE 5 MILLIGRAM(S): 2.5 TABLET ORAL at 05:48

## 2017-04-11 RX ADMIN — Medication 15 MILLIGRAM(S): at 05:49

## 2017-04-11 RX ADMIN — POLYETHYLENE GLYCOL 3350 17 GRAM(S): 17 POWDER, FOR SOLUTION ORAL at 11:51

## 2017-04-11 RX ADMIN — TRAMADOL HYDROCHLORIDE 50 MILLIGRAM(S): 50 TABLET ORAL at 05:48

## 2017-04-11 RX ADMIN — OXYCODONE HYDROCHLORIDE 10 MILLIGRAM(S): 5 TABLET ORAL at 17:09

## 2017-04-11 RX ADMIN — Medication 650 MILLIGRAM(S): at 17:39

## 2017-04-11 RX ADMIN — ATORVASTATIN CALCIUM 10 MILLIGRAM(S): 80 TABLET, FILM COATED ORAL at 05:48

## 2017-04-11 RX ADMIN — Medication 650 MILLIGRAM(S): at 05:48

## 2017-04-11 RX ADMIN — OXYCODONE HYDROCHLORIDE 10 MILLIGRAM(S): 5 TABLET ORAL at 18:03

## 2017-04-11 NOTE — OCCUPATIONAL THERAPY INITIAL EVALUATION ADULT - PERTINENT HX OF CURRENT PROBLEM, REHAB EVAL
Pt is a 71 year old female with hx of HTN, HLD, skin caner, & lymph node cancer (s/p lymph node dissection & chemotherapy 2012) who presents with c/o bilateral hip pain x years. Patient states, she's had multiple injections to relieve pain, but pain got significantly worse over last 4 months. Pt had x-ray done, which revealed "bone on bone.” Pt is now s/p right total hip replacement direct anterior approach on 4/10/2017.

## 2017-04-11 NOTE — DISCHARGE NOTE ADULT - PLAN OF CARE
Pain reduction, improve ambulation and ADLs Patient is a 70 y/o female s/p right hip arthroplasty on 4/10/17. Patient tolerated the procedure well without any intraoperative complications. Patient states pain is controlled. Tolerated Physical Therapy well. Patient seen by medical attending - hospitalist during admission for continued care and management. As per Dr Mckenna patient is stable and ready for discharge.     Please follow up with Dr Mckenna in 1-2 weeks. Call office to make an appointment. Please follow up with your PMD for continuity of care and management. Please keep Aquacel dressing in place until post op day # 14. Any sutures/staples to be removed on post op day # 14.  Please take aspirin twice daily for DVT Prophylaxis. Weight bearing as tolerated. Call orthopaedics with any questions. Please use total hip precautions.

## 2017-04-11 NOTE — DISCHARGE NOTE ADULT - INSTRUCTIONS
may resume diet as prior to surgery You have a postop appointmentwith Dr Mckenna on 4/28/2017 @ 8:45 AM in the [place] office, please keep  aguacell dressing on until this appointment.  Notify Dr Mckenna if you experience increase in pain not relieved with pain medication, any redness, drainage or swelling around incision, or if you develop a fever >101.0.  Continue to do exercises as instructed, use over the counter stool softeners to assist with constipation that can be a side effect of your pain medication.  Maintain hip precautions.

## 2017-04-11 NOTE — DISCHARGE NOTE ADULT - NS AS DC FOLLOWUP STROKE INST
carenotes on surgical procedure, exercise program hip precautions, and d/c medications/Smoking Cessation

## 2017-04-11 NOTE — OCCUPATIONAL THERAPY INITIAL EVALUATION ADULT - NS ASR BATHING EQUIP NEEDS
Pt. to be educated on benefits and how to privately purchase during upcoming ADL session. Pt reports she already owns Clamp-on tub rail./shower chair

## 2017-04-11 NOTE — DISCHARGE NOTE ADULT - MEDICATION SUMMARY - MEDICATIONS TO STOP TAKING
I will STOP taking the medications listed below when I get home from the hospital:    Tylenol Arthritis Caplet  --   650mg 2 tabs  2 x daily

## 2017-04-11 NOTE — OCCUPATIONAL THERAPY INITIAL EVALUATION ADULT - LIVES WITH, PROFILE
Pt. reports she lives with her  in a house with 5 steps to enter. Once inside, pt. reports she has no steps to negotiate and bedroom and bathroom are located on the main level. Per pt., she has a bathtub in her bathroom with Clamp-on Tub rail available.

## 2017-04-11 NOTE — DISCHARGE NOTE ADULT - CONDITIONS AT DISCHARGE
stable, right hip aquacel dressing in place clean, dry and intact, tolerating diet, oob with walker voiding well, tolerating diet.

## 2017-04-11 NOTE — DISCHARGE NOTE ADULT - MEDICATION SUMMARY - MEDICATIONS TO TAKE
I will START or STAY ON the medications listed below when I get home from the hospital:    traMADol 50 mg oral tablet  -- 1 tab(s) by mouth every 8 hours MDD:3  -- Indication: For Pain med     Percocet 5/325 oral tablet  -- 1-2 tab(s) by mouth every 6 hours PRN Pain MDD:8  -- Indication: For Pain med     aspirin 325 mg oral delayed release tablet  -- 1 tab(s) by mouth 2 times a day MDD:2  -- Indication: For DVT Prophylaxis    atorvastatin 10 mg oral tablet  -- 1 tab(s) by mouth once a day  -- Indication: For HLD    amLODIPine-benazepril 5 mg-20 mg oral capsule  -- 1 cap(s) by mouth once a day  -- Indication: For HTN     docusate sodium 100 mg oral capsule  -- 1 cap(s) by mouth 3 times a day  -- Indication: For Stool softener     senna oral tablet  -- 2 tab(s) by mouth once a day (at bedtime) MDD:2  -- Indication: For Stool softener     Lumigan 0.01% ophthalmic solution  -- 1 drop(s) to each affected eye once a day (in the evening)  -- Indication: For Home med

## 2017-04-11 NOTE — DISCHARGE NOTE ADULT - HOME CARE AGENCY
[facility] - [phone]  Nurse to visit the day after hospital discharge; physical therapist to follow. Please contact the home care agency at the above phone number if you have not heard from them by 12 noon on the day after your hospital discharge.

## 2017-04-11 NOTE — DISCHARGE NOTE ADULT - CARE PLAN
Principal Discharge DX:	Unilateral primary osteoarthritis, right hip  Goal:	Pain reduction, improve ambulation and ADLs  Instructions for follow-up, activity and diet:	Patient is a 70 y/o female s/p right hip arthroplasty on 4/10/17. Patient tolerated the procedure well without any intraoperative complications. Patient states pain is controlled. Tolerated Physical Therapy well. Patient seen by medical attending - hospitalist during admission for continued care and management. As per Dr Mckenna patient is stable and ready for discharge.     Please follow up with Dr Mckenna in 1-2 weeks. Call office to make an appointment. Please follow up with your PMD for continuity of care and management. Please keep Aquacel dressing in place until post op day # 14. Any sutures/staples to be removed on post op day # 14.  Please take aspirin twice daily for DVT Prophylaxis. Weight bearing as tolerated. Call orthopaedics with any questions. Please use total hip precautions. Principal Discharge DX:	Unilateral primary osteoarthritis, right hip  Goal:	Pain reduction, improve ambulation and ADLs  Instructions for follow-up, activity and diet:	Patient is a 72 y/o female s/p right hip arthroplasty on 4/10/17. Patient tolerated the procedure well without any intraoperative complications. Patient states pain is controlled. Tolerated Physical Therapy well. Patient seen by medical attending - hospitalist during admission for continued care and management. As per Dr Mckenna patient is stable and ready for discharge.     Please follow up with Dr Mckenna in 1-2 weeks. Call office to make an appointment. Please follow up with your PMD for continuity of care and management. Please keep Aquacel dressing in place until post op day # 14. Any sutures/staples to be removed on post op day # 14.  Please take aspirin twice daily for DVT Prophylaxis. Weight bearing as tolerated. Call orthopaedics with any questions. Please use total hip precautions.

## 2017-04-11 NOTE — OCCUPATIONAL THERAPY INITIAL EVALUATION ADULT - GENERAL OBSERVATIONS, REHAB EVAL
Pt. received sitting in Hip Chair in therapy gym with PT Assistant present. NAD. +C/D/I Dressing to Right Hip, +Heplock.

## 2017-04-11 NOTE — DISCHARGE NOTE ADULT - CARE PROVIDERS DIRECT ADDRESSES
[email or web address]

## 2017-04-11 NOTE — DISCHARGE NOTE ADULT - HOSPITAL COURSE
Patient is a 70 y/o female s/p right hip arthroplasty on 4/10/17. Patient tolerated the procedure well without any intraoperative complications. Patient states pain is controlled. Tolerated Physical Therapy well. Patient seen by medical attending - hospitalist during admission for continued care and management. As per Dr Mckenna patient is stable and ready for discharge.     Please follow up with Dr Mckenna in 1-2 weeks. Call office to make an appointment. Please follow up with your PMD for continuity of care and management. Please keep Aquacel dressing in place until post op day # 14. Any sutures/staples to be removed on post op day # 14.  Please take aspirin twice daily for DVT Prophylaxis. Weight bearing as tolerated. Call orthopaedics with any questions. Please use total hip precautions.

## 2017-04-11 NOTE — DISCHARGE NOTE ADULT - PATIENT PORTAL LINK FT
“You can access the FollowHealth Patient Portal, offered by [facility], by registering with the following website: [email or web address]”

## 2017-04-11 NOTE — DISCHARGE NOTE ADULT - CARE PROVIDER_API CALL
Emiliano Mckenna), Orthopaedic Surgery  [address] [place] [address]  Phone: [phone]  Fax: [phone]

## 2017-04-17 ENCOUNTER — TRANSCRIPTION ENCOUNTER (OUTPATIENT)
Age: 72
End: 2017-04-17

## 2017-04-17 LAB — SURGICAL PATHOLOGY STUDY: SIGNIFICANT CHANGE UP

## 2017-04-19 ENCOUNTER — RX RENEWAL (OUTPATIENT)
Age: 72
End: 2017-04-19

## 2017-04-28 ENCOUNTER — APPOINTMENT (OUTPATIENT)
Dept: ORTHOPEDIC SURGERY | Facility: CLINIC | Age: 72
End: 2017-04-28

## 2017-04-28 VITALS
DIASTOLIC BLOOD PRESSURE: 69 MMHG | SYSTOLIC BLOOD PRESSURE: 116 MMHG | HEART RATE: 98 BPM | HEIGHT: 64 IN | BODY MASS INDEX: 28 KG/M2 | WEIGHT: 164 LBS

## 2017-06-09 ENCOUNTER — APPOINTMENT (OUTPATIENT)
Dept: ORTHOPEDIC SURGERY | Facility: CLINIC | Age: 72
End: 2017-06-09

## 2017-06-09 VITALS
HEART RATE: 94 BPM | DIASTOLIC BLOOD PRESSURE: 75 MMHG | BODY MASS INDEX: 28 KG/M2 | WEIGHT: 164 LBS | SYSTOLIC BLOOD PRESSURE: 128 MMHG | HEIGHT: 64 IN

## 2017-08-25 ENCOUNTER — APPOINTMENT (OUTPATIENT)
Dept: ORTHOPEDIC SURGERY | Facility: CLINIC | Age: 72
End: 2017-08-25
Payer: MEDICARE

## 2017-08-25 VITALS
HEIGHT: 64 IN | WEIGHT: 164 LBS | DIASTOLIC BLOOD PRESSURE: 79 MMHG | SYSTOLIC BLOOD PRESSURE: 138 MMHG | HEART RATE: 81 BPM | BODY MASS INDEX: 28 KG/M2

## 2017-08-25 DIAGNOSIS — M16.12 UNILATERAL PRIMARY OSTEOARTHRITIS, LEFT HIP: ICD-10-CM

## 2017-08-25 PROCEDURE — 99214 OFFICE O/P EST MOD 30 MIN: CPT

## 2017-08-25 RX ORDER — ATORVASTATIN CALCIUM 10 MG/1
10 TABLET, FILM COATED ORAL
Qty: 90 | Refills: 0 | Status: ACTIVE | COMMUNITY
Start: 2016-11-28

## 2017-08-29 ENCOUNTER — OUTPATIENT (OUTPATIENT)
Dept: OUTPATIENT SERVICES | Facility: HOSPITAL | Age: 72
LOS: 1 days | End: 2017-08-29
Payer: MEDICARE

## 2017-08-29 VITALS
WEIGHT: 164.02 LBS | DIASTOLIC BLOOD PRESSURE: 78 MMHG | HEIGHT: 64 IN | HEART RATE: 85 BPM | RESPIRATION RATE: 16 BRPM | TEMPERATURE: 98 F | SYSTOLIC BLOOD PRESSURE: 132 MMHG | OXYGEN SATURATION: 97 %

## 2017-08-29 DIAGNOSIS — Z85.828 PERSONAL HISTORY OF OTHER MALIGNANT NEOPLASM OF SKIN: Chronic | ICD-10-CM

## 2017-08-29 DIAGNOSIS — M16.12 UNILATERAL PRIMARY OSTEOARTHRITIS, LEFT HIP: ICD-10-CM

## 2017-08-29 DIAGNOSIS — H26.9 UNSPECIFIED CATARACT: Chronic | ICD-10-CM

## 2017-08-29 DIAGNOSIS — Z98.890 OTHER SPECIFIED POSTPROCEDURAL STATES: Chronic | ICD-10-CM

## 2017-08-29 DIAGNOSIS — Z96.649 PRESENCE OF UNSPECIFIED ARTIFICIAL HIP JOINT: Chronic | ICD-10-CM

## 2017-08-29 DIAGNOSIS — L05.91 PILONIDAL CYST WITHOUT ABSCESS: Chronic | ICD-10-CM

## 2017-08-29 DIAGNOSIS — I10 ESSENTIAL (PRIMARY) HYPERTENSION: ICD-10-CM

## 2017-08-29 LAB
APPEARANCE UR: CLEAR — SIGNIFICANT CHANGE UP
APTT BLD: 30.3 SEC — SIGNIFICANT CHANGE UP (ref 27.5–37.4)
BILIRUB UR-MCNC: NEGATIVE — SIGNIFICANT CHANGE UP
BLD GP AB SCN SERPL QL: NEGATIVE — SIGNIFICANT CHANGE UP
BLOOD UR QL VISUAL: NEGATIVE — SIGNIFICANT CHANGE UP
BUN SERPL-MCNC: 16 MG/DL — SIGNIFICANT CHANGE UP (ref 7–23)
CALCIUM SERPL-MCNC: 10 MG/DL — SIGNIFICANT CHANGE UP (ref 8.4–10.5)
CHLORIDE SERPL-SCNC: 101 MMOL/L — SIGNIFICANT CHANGE UP (ref 98–107)
CO2 SERPL-SCNC: 25 MMOL/L — SIGNIFICANT CHANGE UP (ref 22–31)
COLOR SPEC: SIGNIFICANT CHANGE UP
CREAT SERPL-MCNC: 0.9 MG/DL — SIGNIFICANT CHANGE UP (ref 0.5–1.3)
GLUCOSE SERPL-MCNC: 136 MG/DL — HIGH (ref 70–99)
GLUCOSE UR-MCNC: NEGATIVE — SIGNIFICANT CHANGE UP
HCT VFR BLD CALC: 38.5 % — SIGNIFICANT CHANGE UP (ref 34.5–45)
HGB BLD-MCNC: 12.8 G/DL — SIGNIFICANT CHANGE UP (ref 11.5–15.5)
INR BLD: 0.94 — SIGNIFICANT CHANGE UP (ref 0.88–1.17)
KETONES UR-MCNC: NEGATIVE — SIGNIFICANT CHANGE UP
LEUKOCYTE ESTERASE UR-ACNC: NEGATIVE — SIGNIFICANT CHANGE UP
MCHC RBC-ENTMCNC: 29.8 PG — SIGNIFICANT CHANGE UP (ref 27–34)
MCHC RBC-ENTMCNC: 33.2 % — SIGNIFICANT CHANGE UP (ref 32–36)
MCV RBC AUTO: 89.7 FL — SIGNIFICANT CHANGE UP (ref 80–100)
MUCOUS THREADS # UR AUTO: SIGNIFICANT CHANGE UP
NITRITE UR-MCNC: NEGATIVE — SIGNIFICANT CHANGE UP
NRBC # FLD: 0 — SIGNIFICANT CHANGE UP
PH UR: 6 — SIGNIFICANT CHANGE UP (ref 4.6–8)
PLATELET # BLD AUTO: 212 K/UL — SIGNIFICANT CHANGE UP (ref 150–400)
PMV BLD: 10.2 FL — SIGNIFICANT CHANGE UP (ref 7–13)
POTASSIUM SERPL-MCNC: 4.7 MMOL/L — SIGNIFICANT CHANGE UP (ref 3.5–5.3)
POTASSIUM SERPL-SCNC: 4.7 MMOL/L — SIGNIFICANT CHANGE UP (ref 3.5–5.3)
PROT UR-MCNC: NEGATIVE — SIGNIFICANT CHANGE UP
PROTHROM AB SERPL-ACNC: 10.5 SEC — SIGNIFICANT CHANGE UP (ref 9.8–13.1)
RBC # BLD: 4.29 M/UL — SIGNIFICANT CHANGE UP (ref 3.8–5.2)
RBC # FLD: 13.2 % — SIGNIFICANT CHANGE UP (ref 10.3–14.5)
RH IG SCN BLD-IMP: POSITIVE — SIGNIFICANT CHANGE UP
SODIUM SERPL-SCNC: 142 MMOL/L — SIGNIFICANT CHANGE UP (ref 135–145)
SP GR SPEC: 1.01 — SIGNIFICANT CHANGE UP (ref 1–1.03)
SQUAMOUS # UR AUTO: SIGNIFICANT CHANGE UP
UROBILINOGEN FLD QL: NORMAL E.U. — SIGNIFICANT CHANGE UP (ref 0.1–0.2)
WBC # BLD: 7.36 K/UL — SIGNIFICANT CHANGE UP (ref 3.8–10.5)
WBC # FLD AUTO: 7.36 K/UL — SIGNIFICANT CHANGE UP (ref 3.8–10.5)
WBC UR QL: SIGNIFICANT CHANGE UP (ref 0–?)

## 2017-08-29 PROCEDURE — 93010 ELECTROCARDIOGRAM REPORT: CPT

## 2017-08-29 RX ORDER — PANTOPRAZOLE SODIUM 20 MG/1
40 TABLET, DELAYED RELEASE ORAL ONCE
Qty: 0 | Refills: 0 | Status: COMPLETED | OUTPATIENT
Start: 2017-09-11 | End: 2017-09-11

## 2017-08-29 RX ORDER — SODIUM CHLORIDE 9 MG/ML
1000 INJECTION, SOLUTION INTRAVENOUS
Qty: 0 | Refills: 0 | Status: DISCONTINUED | OUTPATIENT
Start: 2017-09-11 | End: 2017-09-11

## 2017-08-29 RX ORDER — TRAMADOL HYDROCHLORIDE 50 MG/1
50 TABLET ORAL ONCE
Qty: 0 | Refills: 0 | Status: DISCONTINUED | OUTPATIENT
Start: 2017-09-11 | End: 2017-09-11

## 2017-08-29 RX ORDER — SODIUM CHLORIDE 9 MG/ML
3 INJECTION INTRAMUSCULAR; INTRAVENOUS; SUBCUTANEOUS EVERY 8 HOURS
Qty: 0 | Refills: 0 | Status: DISCONTINUED | OUTPATIENT
Start: 2017-09-11 | End: 2017-09-12

## 2017-08-29 RX ORDER — GABAPENTIN 400 MG/1
100 CAPSULE ORAL ONCE
Qty: 0 | Refills: 0 | Status: COMPLETED | OUTPATIENT
Start: 2017-09-11 | End: 2017-09-11

## 2017-08-29 RX ORDER — ACETAMINOPHEN 500 MG
975 TABLET ORAL ONCE
Qty: 0 | Refills: 0 | Status: COMPLETED | OUTPATIENT
Start: 2017-09-11 | End: 2017-09-11

## 2017-08-29 NOTE — H&P PST ADULT - LYMPHATIC
supraclavicular L/posterior cervical L/supraclavicular R/anterior cervical R/anterior cervical L/posterior cervical R

## 2017-08-29 NOTE — H&P PST ADULT - NEUROLOGICAL
Alert & oriented; no sensory, motor or coordination deficits, normal reflexes details… detailed exam

## 2017-08-29 NOTE — H&P PST ADULT - MS GEN HX ROS MEA POS PC
arthritis/muscle cramps/joint pain/leg pain L/back pain/left hip pain that radiates down the left thigh

## 2017-08-29 NOTE — H&P PST ADULT - NEGATIVE CARDIOVASCULAR SYMPTOMS
no claudication/no paroxysmal nocturnal dyspnea/no dyspnea on exertion/no orthopnea/no palpitations/no peripheral edema/no chest pain

## 2017-08-29 NOTE — H&P PST ADULT - NEGATIVE OPHTHALMOLOGIC SYMPTOMS
no diplopia/no lacrimation L/no pain R/no discharge R/no blurred vision L/no blurred vision R/no pain L/no lacrimation R/no discharge L

## 2017-08-29 NOTE — H&P PST ADULT - RS GEN PE MLT RESP DETAILS PC
respirations non-labored/breath sounds equal/airway patent/clear to auscultation bilaterally/no wheezes/no chest wall tenderness/good air movement

## 2017-08-29 NOTE — H&P PST ADULT - NEGATIVE NEUROLOGICAL SYMPTOMS
no focal seizures/no tremors/no vertigo/no transient paralysis/no generalized seizures/no loss of consciousness/no headache/no hemiparesis/no facial palsy/no loss of sensation/no syncope/no weakness/no paresthesias/no confusion

## 2017-08-29 NOTE — H&P PST ADULT - OPHTHALMOLOGIC COMMENTS
wear glasses. h/o glaucoma and s/p cataract extraction wear glasses. h/o glaucoma and s/p cataract extraction with lens implant

## 2017-08-29 NOTE — H&P PST ADULT - NEGATIVE BREAST SYMPTOMS
no breast tenderness R/no nipple discharge L/no nipple discharge R/no breast tenderness L/no breast lump L/no breast lump R

## 2017-08-29 NOTE — H&P PST ADULT - PSH
Cataract  s/p cataract surgery (2016)  History of hip replacement  right March 2017  History of lymph node dissection of left axilla  2012  History of skin cancer  s/p melanomas removal 2007  Pilonidal cyst  s/p extraction in 1962 Cataract  s/p cataract surgery (2016)  History of ear surgery  and nose surgery from skin CA  History of hip replacement  right March 2017  History of lymph node dissection of left axilla  2012  History of skin cancer  s/p melanomas removal 2007  Pilonidal cyst  s/p extraction in 1962

## 2017-08-29 NOTE — H&P PST ADULT - MUSCULOSKELETAL
detailed exam no joint erythema/no joint warmth/decreased ROM due to pain/left hip/diminished strength details…

## 2017-08-29 NOTE — H&P PST ADULT - PROBLEM SELECTOR PLAN 1
Scheduled for Left Total Hip Replacement Direct Anterior Approach on 9/11/2017.   Pre op instructions given, pt verbalized understanding  Chlorhexidine wash and appropriate checklist provided  GI prophylaxis provided  Pt has appt on 9/7 for medical clearance, clearance note pending

## 2017-08-29 NOTE — H&P PST ADULT - HISTORY OF PRESENT ILLNESS
71 y/o female with PMH of HTN, HLD, Basal Cell Carcinoma and Glaucoma presents to have PST for preoperative evaluation with diagnosis of unilateral primary osteoarthritis left hip. H/o bilateral hip pain for year and s/p right hip replacement April 2017. Pt states she is healing well from her hip replacement. Reports chronic left hip that affects her ability to perform activities of daily living.  Scheduled for Left Total Hip Replacement Direct Anterior Approach on 9/11/2017. 73 y/o female with PMH of HTN, HLD, Basal Cell Carcinoma and Glaucoma presents to have PST for preoperative evaluation with diagnosis of unilateral primary osteoarthritis, left hip. H/o bilateral hip pain for years and s/p right hip replacement April 2017. Pt states she is healing well from her hip replacement. c/o chronic left hip pain that affects her ability to perform activities of daily living. Scheduled for Left Total Hip Replacement Direct Anterior Approach on 9/11/2017. 73 y/o female with PMH of HTN, HLD, Basal Cell Carcinoma and Glaucoma presents to have PST for preoperative evaluation with diagnosis of unilateral primary osteoarthritis, left hip. H/o bilateral hip pain for many years and s/p right hip replacement April 2017. At present, pt c/o chronic left hip pain that affects her ability to perform activities of daily living. She also requires the use of a cane for ambulation. Scheduled for Left Total Hip Replacement Direct Anterior Approach on 9/11/2017.

## 2017-08-29 NOTE — H&P PST ADULT - PMH
Cataract  s/p surgery 2016  Glaucoma    Hyperlipidemia    Hypertension    Lymph node cancer  s/p lymph node dissection & chemotherapy (2012)  Skin cancer  basal cell carcinoma  Unilateral primary osteoarthritis, left hip

## 2017-08-30 LAB — SPECIMEN SOURCE: SIGNIFICANT CHANGE UP

## 2017-08-31 LAB
BACTERIA UR CULT: SIGNIFICANT CHANGE UP
SPECIMEN SOURCE: SIGNIFICANT CHANGE UP

## 2017-09-01 LAB — BACTERIA NPH CULT: SIGNIFICANT CHANGE UP

## 2017-09-08 NOTE — ASU PATIENT PROFILE, ADULT - PSH
Cataract  s/p cataract surgery (2016)  History of ear surgery  and nose surgery from skin CA  History of hip replacement  right March 2017  History of lymph node dissection of left axilla  2012  History of skin cancer  s/p melanomas removal 2007  Pilonidal cyst  s/p extraction in 1962

## 2017-09-11 ENCOUNTER — INPATIENT (INPATIENT)
Facility: HOSPITAL | Age: 72
LOS: 0 days | Discharge: HOME HEALTH | End: 2017-09-12
Attending: ORTHOPAEDIC SURGERY | Admitting: ORTHOPAEDIC SURGERY
Payer: MEDICARE

## 2017-09-11 ENCOUNTER — APPOINTMENT (OUTPATIENT)
Dept: ORTHOPEDIC SURGERY | Facility: HOSPITAL | Age: 72
End: 2017-09-11

## 2017-09-11 ENCOUNTER — TRANSCRIPTION ENCOUNTER (OUTPATIENT)
Age: 72
End: 2017-09-11

## 2017-09-11 ENCOUNTER — RESULT REVIEW (OUTPATIENT)
Age: 72
End: 2017-09-11

## 2017-09-11 VITALS
SYSTOLIC BLOOD PRESSURE: 159 MMHG | DIASTOLIC BLOOD PRESSURE: 75 MMHG | HEART RATE: 92 BPM | RESPIRATION RATE: 18 BRPM | OXYGEN SATURATION: 98 % | WEIGHT: 164.91 LBS | HEIGHT: 64 IN | TEMPERATURE: 98 F

## 2017-09-11 DIAGNOSIS — M16.12 UNILATERAL PRIMARY OSTEOARTHRITIS, LEFT HIP: ICD-10-CM

## 2017-09-11 DIAGNOSIS — Z96.649 PRESENCE OF UNSPECIFIED ARTIFICIAL HIP JOINT: Chronic | ICD-10-CM

## 2017-09-11 DIAGNOSIS — L05.91 PILONIDAL CYST WITHOUT ABSCESS: Chronic | ICD-10-CM

## 2017-09-11 DIAGNOSIS — Z98.890 OTHER SPECIFIED POSTPROCEDURAL STATES: Chronic | ICD-10-CM

## 2017-09-11 DIAGNOSIS — H26.9 UNSPECIFIED CATARACT: Chronic | ICD-10-CM

## 2017-09-11 DIAGNOSIS — Z85.828 PERSONAL HISTORY OF OTHER MALIGNANT NEOPLASM OF SKIN: Chronic | ICD-10-CM

## 2017-09-11 LAB
BUN SERPL-MCNC: 17 MG/DL — SIGNIFICANT CHANGE UP (ref 7–23)
CALCIUM SERPL-MCNC: 9.3 MG/DL — SIGNIFICANT CHANGE UP (ref 8.4–10.5)
CHLORIDE SERPL-SCNC: 103 MMOL/L — SIGNIFICANT CHANGE UP (ref 98–107)
CO2 SERPL-SCNC: 24 MMOL/L — SIGNIFICANT CHANGE UP (ref 22–31)
CREAT SERPL-MCNC: 0.96 MG/DL — SIGNIFICANT CHANGE UP (ref 0.5–1.3)
GLUCOSE SERPL-MCNC: 143 MG/DL — HIGH (ref 70–99)
HCT VFR BLD CALC: 34 % — LOW (ref 34.5–45)
HGB BLD-MCNC: 11.1 G/DL — LOW (ref 11.5–15.5)
MCHC RBC-ENTMCNC: 29.1 PG — SIGNIFICANT CHANGE UP (ref 27–34)
MCHC RBC-ENTMCNC: 32.6 % — SIGNIFICANT CHANGE UP (ref 32–36)
MCV RBC AUTO: 89 FL — SIGNIFICANT CHANGE UP (ref 80–100)
NRBC # FLD: 0 — SIGNIFICANT CHANGE UP
PLATELET # BLD AUTO: 204 K/UL — SIGNIFICANT CHANGE UP (ref 150–400)
PMV BLD: 10.3 FL — SIGNIFICANT CHANGE UP (ref 7–13)
POTASSIUM SERPL-MCNC: 4.3 MMOL/L — SIGNIFICANT CHANGE UP (ref 3.5–5.3)
POTASSIUM SERPL-SCNC: 4.3 MMOL/L — SIGNIFICANT CHANGE UP (ref 3.5–5.3)
RBC # BLD: 3.82 M/UL — SIGNIFICANT CHANGE UP (ref 3.8–5.2)
RBC # FLD: 13.3 % — SIGNIFICANT CHANGE UP (ref 10.3–14.5)
SODIUM SERPL-SCNC: 143 MMOL/L — SIGNIFICANT CHANGE UP (ref 135–145)
WBC # BLD: 10.87 K/UL — HIGH (ref 3.8–10.5)
WBC # FLD AUTO: 10.87 K/UL — HIGH (ref 3.8–10.5)

## 2017-09-11 PROCEDURE — 88305 TISSUE EXAM BY PATHOLOGIST: CPT | Mod: 26

## 2017-09-11 PROCEDURE — 27130 TOTAL HIP ARTHROPLASTY: CPT | Mod: LT

## 2017-09-11 PROCEDURE — 73501 X-RAY EXAM HIP UNI 1 VIEW: CPT | Mod: 26,LT

## 2017-09-11 PROCEDURE — 88311 DECALCIFY TISSUE: CPT | Mod: 26

## 2017-09-11 RX ORDER — BIMATOPROST 0.3 MG/ML
1 SOLUTION/ DROPS OPHTHALMIC
Qty: 0 | Refills: 0 | COMMUNITY

## 2017-09-11 RX ORDER — ATORVASTATIN CALCIUM 80 MG/1
1 TABLET, FILM COATED ORAL
Qty: 0 | Refills: 0 | COMMUNITY

## 2017-09-11 RX ORDER — PANTOPRAZOLE SODIUM 20 MG/1
40 TABLET, DELAYED RELEASE ORAL DAILY
Qty: 0 | Refills: 0 | Status: DISCONTINUED | OUTPATIENT
Start: 2017-09-11 | End: 2017-09-12

## 2017-09-11 RX ORDER — SENNA PLUS 8.6 MG/1
2 TABLET ORAL AT BEDTIME
Qty: 0 | Refills: 0 | Status: DISCONTINUED | OUTPATIENT
Start: 2017-09-11 | End: 2017-09-12

## 2017-09-11 RX ORDER — DEXAMETHASONE 0.5 MG/5ML
10 ELIXIR ORAL ONCE
Qty: 0 | Refills: 0 | Status: COMPLETED | OUTPATIENT
Start: 2017-09-12 | End: 2017-09-12

## 2017-09-11 RX ORDER — MORPHINE SULFATE 50 MG/1
2 CAPSULE, EXTENDED RELEASE ORAL EVERY 4 HOURS
Qty: 0 | Refills: 0 | Status: DISCONTINUED | OUTPATIENT
Start: 2017-09-11 | End: 2017-09-12

## 2017-09-11 RX ORDER — MORPHINE SULFATE 50 MG/1
0.1 CAPSULE, EXTENDED RELEASE ORAL ONCE
Qty: 0 | Refills: 0 | Status: DISCONTINUED | OUTPATIENT
Start: 2017-09-11 | End: 2017-09-11

## 2017-09-11 RX ORDER — ASPIRIN/CALCIUM CARB/MAGNESIUM 324 MG
1 TABLET ORAL
Qty: 84 | Refills: 0 | OUTPATIENT
Start: 2017-09-11 | End: 2017-10-23

## 2017-09-11 RX ORDER — ONDANSETRON 8 MG/1
4 TABLET, FILM COATED ORAL EVERY 6 HOURS
Qty: 0 | Refills: 0 | Status: DISCONTINUED | OUTPATIENT
Start: 2017-09-11 | End: 2017-09-11

## 2017-09-11 RX ORDER — LATANOPROST 0.05 MG/ML
1 SOLUTION/ DROPS OPHTHALMIC; TOPICAL AT BEDTIME
Qty: 0 | Refills: 0 | Status: DISCONTINUED | OUTPATIENT
Start: 2017-09-11 | End: 2017-09-12

## 2017-09-11 RX ORDER — AMLODIPINE BESYLATE 2.5 MG/1
5 TABLET ORAL DAILY
Qty: 0 | Refills: 0 | Status: DISCONTINUED | OUTPATIENT
Start: 2017-09-11 | End: 2017-09-12

## 2017-09-11 RX ORDER — ONDANSETRON 8 MG/1
4 TABLET, FILM COATED ORAL EVERY 6 HOURS
Qty: 0 | Refills: 0 | Status: DISCONTINUED | OUTPATIENT
Start: 2017-09-11 | End: 2017-09-12

## 2017-09-11 RX ORDER — TRAMADOL HYDROCHLORIDE 50 MG/1
25 TABLET ORAL THREE TIMES A DAY
Qty: 0 | Refills: 0 | Status: DISCONTINUED | OUTPATIENT
Start: 2017-09-11 | End: 2017-09-12

## 2017-09-11 RX ORDER — SODIUM CHLORIDE 9 MG/ML
1000 INJECTION INTRAMUSCULAR; INTRAVENOUS; SUBCUTANEOUS ONCE
Qty: 0 | Refills: 0 | Status: COMPLETED | OUTPATIENT
Start: 2017-09-11 | End: 2017-09-11

## 2017-09-11 RX ORDER — ACETAMINOPHEN 500 MG
650 TABLET ORAL EVERY 6 HOURS
Qty: 0 | Refills: 0 | Status: DISCONTINUED | OUTPATIENT
Start: 2017-09-11 | End: 2017-09-12

## 2017-09-11 RX ORDER — TRAMADOL HYDROCHLORIDE 50 MG/1
0.5 TABLET ORAL
Qty: 15 | Refills: 0 | OUTPATIENT
Start: 2017-09-11 | End: 2017-09-21

## 2017-09-11 RX ORDER — ATORVASTATIN CALCIUM 80 MG/1
10 TABLET, FILM COATED ORAL AT BEDTIME
Qty: 0 | Refills: 0 | Status: DISCONTINUED | OUTPATIENT
Start: 2017-09-11 | End: 2017-09-12

## 2017-09-11 RX ORDER — SODIUM CHLORIDE 9 MG/ML
1000 INJECTION INTRAMUSCULAR; INTRAVENOUS; SUBCUTANEOUS ONCE
Qty: 0 | Refills: 0 | Status: COMPLETED | OUTPATIENT
Start: 2017-09-12 | End: 2017-09-12

## 2017-09-11 RX ORDER — DOCUSATE SODIUM 100 MG
100 CAPSULE ORAL THREE TIMES A DAY
Qty: 0 | Refills: 0 | Status: DISCONTINUED | OUTPATIENT
Start: 2017-09-11 | End: 2017-09-12

## 2017-09-11 RX ORDER — MELOXICAM 15 MG/1
1 TABLET ORAL
Qty: 30 | Refills: 0 | OUTPATIENT
Start: 2017-09-11 | End: 2017-10-11

## 2017-09-11 RX ORDER — FENTANYL CITRATE 50 UG/ML
50 INJECTION INTRAVENOUS
Qty: 0 | Refills: 0 | Status: DISCONTINUED | OUTPATIENT
Start: 2017-09-11 | End: 2017-09-11

## 2017-09-11 RX ORDER — ACETAMINOPHEN 500 MG
2 TABLET ORAL
Qty: 0 | Refills: 0 | COMMUNITY

## 2017-09-11 RX ORDER — FENTANYL CITRATE 50 UG/ML
25 INJECTION INTRAVENOUS
Qty: 0 | Refills: 0 | Status: DISCONTINUED | OUTPATIENT
Start: 2017-09-11 | End: 2017-09-11

## 2017-09-11 RX ORDER — DOCUSATE SODIUM 100 MG
1 CAPSULE ORAL
Qty: 0 | Refills: 0 | COMMUNITY
Start: 2017-09-11

## 2017-09-11 RX ORDER — NALOXONE HYDROCHLORIDE 4 MG/.1ML
0.1 SPRAY NASAL
Qty: 0 | Refills: 0 | Status: DISCONTINUED | OUTPATIENT
Start: 2017-09-11 | End: 2017-09-12

## 2017-09-11 RX ORDER — METOCLOPRAMIDE HCL 10 MG
10 TABLET ORAL ONCE
Qty: 0 | Refills: 0 | Status: COMPLETED | OUTPATIENT
Start: 2017-09-11 | End: 2017-09-11

## 2017-09-11 RX ORDER — SENNA PLUS 8.6 MG/1
2 TABLET ORAL
Qty: 0 | Refills: 0 | COMMUNITY
Start: 2017-09-11

## 2017-09-11 RX ORDER — KETOROLAC TROMETHAMINE 30 MG/ML
15 SYRINGE (ML) INJECTION EVERY 6 HOURS
Qty: 0 | Refills: 0 | Status: COMPLETED | OUTPATIENT
Start: 2017-09-12 | End: 2017-09-13

## 2017-09-11 RX ORDER — MORPHINE SULFATE 50 MG/1
4 CAPSULE, EXTENDED RELEASE ORAL ONCE
Qty: 0 | Refills: 0 | Status: DISCONTINUED | OUTPATIENT
Start: 2017-09-11 | End: 2017-09-11

## 2017-09-11 RX ORDER — CEFAZOLIN SODIUM 1 G
2000 VIAL (EA) INJECTION EVERY 8 HOURS
Qty: 0 | Refills: 0 | Status: COMPLETED | OUTPATIENT
Start: 2017-09-11 | End: 2017-09-12

## 2017-09-11 RX ORDER — TRAMADOL HYDROCHLORIDE 50 MG/1
50 TABLET ORAL EVERY 8 HOURS
Qty: 0 | Refills: 0 | Status: DISCONTINUED | OUTPATIENT
Start: 2017-09-11 | End: 2017-09-11

## 2017-09-11 RX ORDER — OXYCODONE HYDROCHLORIDE 5 MG/1
10 TABLET ORAL EVERY 4 HOURS
Qty: 0 | Refills: 0 | Status: DISCONTINUED | OUTPATIENT
Start: 2017-09-11 | End: 2017-09-12

## 2017-09-11 RX ORDER — FAMOTIDINE 10 MG/ML
1 INJECTION INTRAVENOUS
Qty: 90 | Refills: 0 | OUTPATIENT
Start: 2017-09-11 | End: 2017-10-26

## 2017-09-11 RX ORDER — POLYETHYLENE GLYCOL 3350 17 G/17G
17 POWDER, FOR SOLUTION ORAL DAILY
Qty: 0 | Refills: 0 | Status: DISCONTINUED | OUTPATIENT
Start: 2017-09-11 | End: 2017-09-12

## 2017-09-11 RX ORDER — LISINOPRIL 2.5 MG/1
20 TABLET ORAL DAILY
Qty: 0 | Refills: 0 | Status: DISCONTINUED | OUTPATIENT
Start: 2017-09-11 | End: 2017-09-12

## 2017-09-11 RX ORDER — OXYCODONE HYDROCHLORIDE 5 MG/1
5 TABLET ORAL EVERY 4 HOURS
Qty: 0 | Refills: 0 | Status: DISCONTINUED | OUTPATIENT
Start: 2017-09-11 | End: 2017-09-12

## 2017-09-11 RX ORDER — KETOROLAC TROMETHAMINE 30 MG/ML
15 SYRINGE (ML) INJECTION ONCE
Qty: 0 | Refills: 0 | Status: DISCONTINUED | OUTPATIENT
Start: 2017-09-11 | End: 2017-09-11

## 2017-09-11 RX ORDER — SODIUM CHLORIDE 9 MG/ML
1000 INJECTION, SOLUTION INTRAVENOUS
Qty: 0 | Refills: 0 | Status: DISCONTINUED | OUTPATIENT
Start: 2017-09-11 | End: 2017-09-12

## 2017-09-11 RX ORDER — ASPIRIN/CALCIUM CARB/MAGNESIUM 324 MG
325 TABLET ORAL
Qty: 0 | Refills: 0 | Status: DISCONTINUED | OUTPATIENT
Start: 2017-09-11 | End: 2017-09-12

## 2017-09-11 RX ADMIN — GABAPENTIN 100 MILLIGRAM(S): 400 CAPSULE ORAL at 10:52

## 2017-09-11 RX ADMIN — SODIUM CHLORIDE 30 MILLILITER(S): 9 INJECTION, SOLUTION INTRAVENOUS at 10:51

## 2017-09-11 RX ADMIN — OXYCODONE HYDROCHLORIDE 10 MILLIGRAM(S): 5 TABLET ORAL at 15:42

## 2017-09-11 RX ADMIN — SODIUM CHLORIDE 1000 MILLILITER(S): 9 INJECTION INTRAMUSCULAR; INTRAVENOUS; SUBCUTANEOUS at 15:42

## 2017-09-11 RX ADMIN — LATANOPROST 1 DROP(S): 0.05 SOLUTION/ DROPS OPHTHALMIC; TOPICAL at 23:42

## 2017-09-11 RX ADMIN — Medication 325 MILLIGRAM(S): at 22:20

## 2017-09-11 RX ADMIN — ATORVASTATIN CALCIUM 10 MILLIGRAM(S): 80 TABLET, FILM COATED ORAL at 22:20

## 2017-09-11 RX ADMIN — Medication 100 MILLIGRAM(S): at 19:31

## 2017-09-11 RX ADMIN — Medication 10 MILLIGRAM(S): at 18:35

## 2017-09-11 RX ADMIN — OXYCODONE HYDROCHLORIDE 10 MILLIGRAM(S): 5 TABLET ORAL at 16:30

## 2017-09-11 RX ADMIN — SODIUM CHLORIDE 150 MILLILITER(S): 9 INJECTION, SOLUTION INTRAVENOUS at 14:00

## 2017-09-11 RX ADMIN — PANTOPRAZOLE SODIUM 40 MILLIGRAM(S): 20 TABLET, DELAYED RELEASE ORAL at 10:51

## 2017-09-11 RX ADMIN — SODIUM CHLORIDE 3 MILLILITER(S): 9 INJECTION INTRAMUSCULAR; INTRAVENOUS; SUBCUTANEOUS at 22:22

## 2017-09-11 RX ADMIN — Medication 100 MILLIGRAM(S): at 22:20

## 2017-09-11 RX ADMIN — ONDANSETRON 4 MILLIGRAM(S): 8 TABLET, FILM COATED ORAL at 15:35

## 2017-09-11 RX ADMIN — Medication 975 MILLIGRAM(S): at 10:51

## 2017-09-11 RX ADMIN — TRAMADOL HYDROCHLORIDE 50 MILLIGRAM(S): 50 TABLET ORAL at 10:52

## 2017-09-11 RX ADMIN — SENNA PLUS 2 TABLET(S): 8.6 TABLET ORAL at 22:20

## 2017-09-11 RX ADMIN — Medication 650 MILLIGRAM(S): at 23:43

## 2017-09-11 NOTE — DISCHARGE NOTE ADULT - INSTRUCTIONS
no changes resume same diet as prior to surgery You have a post op appointment with Dr. Mckenna on September 29, 2017 @ 8:45am. if you are unable to keep this appointment, please call the office to reschedule. Call MD if you develop a fever, or if there is redness, swelling, drainage or pain not relieved by pain medication. No heavy lifting, bending, or straining to move your bowels. Take over the counter stool softeners as needed to prevent constipation which may be caused by pain medication.

## 2017-09-11 NOTE — DISCHARGE NOTE ADULT - ADDITIONAL INSTRUCTIONS
post surgical care  71yo female is s/p Left total hip arthroplasty 9/11/2017 without any intraoperative complications.  Patient is doing well and stable for discharge.  Patient is tolerating physical therapy: weight bearing to Left leg, gait training, STRICT ANTERIOR HIP PRECAUTIONS.  If applicable, keep Aquacel dressing on as is until day of staple removal.  Staples/sutures to be removed 14 days from date of surgery.  Patient is on Aspirin (MUST TAKE WITH FOOD) for anticoagulation.  Orthopaedic Surgeon Dr. Mckenna would like patient to call private MD/Internist for appointment postop to maintain continuity of care.  Follow up with Dr. Mckenna's office in 1-2 weeks.  Istop reviewed post surgical care  71yo female is s/p Left total hip arthroplasty 9/11/2017 without any intraoperative complications.  Patient is doing well and stable for discharge.  Patient is tolerating physical therapy: weight bearing to Left leg, gait training, STRICT ANTERIOR HIP PRECAUTIONS.  If applicable, keep Aquacel dressing on as is until day of staple removal.  Staples/sutures to be removed 14 days from date of surgery.  Patient is on Aspirin (MUST TAKE WITH FOOD) for anticoagulation.  Orthopaedic Surgeon Dr. Mckenna would like patient to call private MD/Internist for appointment postop to maintain continuity of care.  Follow up with Dr. Mckenna's office in 1-2 weeks.  Istop reviewed Reference #: [id number]

## 2017-09-11 NOTE — DISCHARGE NOTE ADULT - PATIENT PORTAL LINK FT
“You can access the FollowHealth Patient Portal, offered by [facility], by registering with the following website: [email or web address]”

## 2017-09-11 NOTE — DISCHARGE NOTE ADULT - MEDICATION SUMMARY - MEDICATIONS TO STOP TAKING
I will STOP taking the medications listed below when I get home from the hospital:    Tylenol 8 Hour 650 mg oral tablet, extended release  -- 2 tab(s) by mouth once a day

## 2017-09-11 NOTE — DISCHARGE NOTE ADULT - CARE PLAN
Principal Discharge DX:	Unilateral primary osteoarthritis, left hip  Goal:	pain control, surgical site healing, ambulation, return to ADL's  Instructions for follow-up, activity and diet:	71yo is s/p left total hip arthroplasty on 9/11/17  without any intraoperative complications.  Pt is doing well and stable for discharge.  Pt is tolerating physical therapy: WBAT with cane/walker if needed, ANTERIOR TOTAL HIP PRECAUTIONS, gait training.  Leave Aquacel dressing on until post op office visit(POD#14).   Pt is on enteric coated ASA 325mg PO BID for DVT prophylaxis take for 6 weeks unless otherwise directed by surgeon.  follow up with Dr. Mckenna  in two weeks.  Follow up with primary care doctor in 1-2 weeks for continuity of care. Principal Discharge DX:	Primary osteoarthritis of left hip  Goal:	pain contro -> pain med may cause drowsiness, refrain from activities require decision making; physical therapy to help resume activities of daily living  Instructions for follow-up, activity and diet:	Office appointment is already made (see card attached to discharge folder) so if you need to reschedule please call Dr. Mckenna's office [phone]  weight bearing as tolerated to Left leg  TOTAL HIP PRECAUTIONS

## 2017-09-11 NOTE — DISCHARGE NOTE ADULT - NS AS DC FOLLOWUP STROKE INST
hip, anterior precautions, exercise worksheet, ectorin, mobic, tramadol, percocet/Influenza vaccination (VIS Pub Date: August 19, 2014)

## 2017-09-11 NOTE — DISCHARGE NOTE ADULT - PLAN OF CARE
pain control, surgical site healing, ambulation, return to ADL's 73yo is s/p left total hip arthroplasty on 9/11/17  without any intraoperative complications.  Pt is doing well and stable for discharge.  Pt is tolerating physical therapy: WBAT with cane/walker if needed, ANTERIOR TOTAL HIP PRECAUTIONS, gait training.  Leave Aquacel dressing on until post op office visit(POD#14).   Pt is on enteric coated ASA 325mg PO BID for DVT prophylaxis take for 6 weeks unless otherwise directed by surgeon.  follow up with Dr. Mckenna  in two weeks.  Follow up with primary care doctor in 1-2 weeks for continuity of care. pain contro -> pain med may cause drowsiness, refrain from activities require decision making; physical therapy to help resume activities of daily living Office appointment is already made (see card attached to discharge folder) so if you need to reschedule please call Dr. Mckenna's office [phone]  weight bearing as tolerated to Left leg  TOTAL HIP PRECAUTIONS

## 2017-09-11 NOTE — PHYSICAL THERAPY INITIAL EVALUATION ADULT - PLANNED THERAPY INTERVENTIONS, PT EVAL
stair training/transfer training/ROM/strengthening/balance training/bed mobility training/gait training

## 2017-09-11 NOTE — DISCHARGE NOTE ADULT - MEDICATION SUMMARY - MEDICATIONS TO TAKE
I will START or STAY ON the medications listed below when I get home from the hospital:    atorvastatin 10 mg oral tablet  -- 1 tab(s) by mouth once a day in am reviewed with patient on 8/29/17 continues on dose  -- Indication: For Home med    amLODIPine-benazepril 5 mg-20 mg oral capsule  -- 1 cap(s) by mouth once a day in am reviewed with patient continues on dose as prescribe 8/29/17  -- Indication: For Home med    senna oral tablet  -- 2 tab(s) by mouth once a day (at bedtime)  over the counter for constipation from pain meds   -- Indication: For stool stimulant    docusate sodium 100 mg oral capsule  -- 1 cap(s) by mouth 3 times a day  over the counter for constipation from pain meds   -- Indication: For stool softener    Lumigan 0.01% ophthalmic solution  -- 1 drop(s) to each affected eye once a day (in the evening)   -- Indication: For Home med I will START or STAY ON the medications listed below when I get home from the hospital:    Mobic 15 mg oral tablet  -- 1 tab(s) by mouth once a day (take with food)  -- Do not take this drug if you are pregnant.  Obtain medical advice before taking any non-prescription drugs as some may affect the action of this medication.  Take with food or milk.    -- Indication: For anti inflammatory    Percocet 5/325 oral tablet  -- 1-2 tab(s) by mouth every 6 hours AS NEEDED for moderate to severe pain  begin tapering off as pain decreases  MDD:EIGHT TABS  -- Caution federal law prohibits the transfer of this drug to any person other  than the person for whom it was prescribed.  May cause drowsiness.  Alcohol may intensify this effect.  Use care when operating dangerous machinery.  This prescription cannot be refilled.  This product contains acetaminophen.  Do not use  with any other product containing acetaminophen to prevent possible liver damage.  Using more of this medication than prescribed may cause serious breathing problems.    -- Indication: For Pain control    traMADol 50 mg oral tablet  -- 0.5 tab(s) by mouth 3 times a day   begin tapering off as pain decreases  MDD:75mg total per day  -- Indication: For Pain control FOR MILD PAIN    aspirin 325 mg oral delayed release tablet  -- 1 tab(s) by mouth 2 times a day (with meals)   -- Indication: For blood thinner MUST TAKE WITH FOOD    atorvastatin 10 mg oral tablet  -- 1 tab(s) by mouth once a day in am reviewed with patient on 8/29/17 continues on dose  -- Indication: For Home med    amLODIPine-benazepril 5 mg-20 mg oral capsule  -- 1 cap(s) by mouth once a day in am reviewed with patient continues on dose as prescribe 8/29/17  -- Indication: For Home med    Pepcid 20 mg oral tablet  -- 1 tab(s) by mouth 2 times a day   -- It is very important that you take or use this exactly as directed.  Do not skip doses or discontinue unless directed by your doctor.  Obtain medical advice before taking any non-prescription drugs as some may affect the action of this medication.    -- Indication: For stomach protectant WHILE ON ASPIRIN    senna oral tablet  -- 2 tab(s) by mouth once a day (at bedtime)  over the counter for constipation from pain meds   -- Indication: For stool stimulant    docusate sodium 100 mg oral capsule  -- 1 cap(s) by mouth 3 times a day  over the counter for constipation from pain meds   -- Indication: For stool softener    Lumigan 0.01% ophthalmic solution  -- 1 drop(s) to each affected eye once a day (in the evening)   -- Indication: For Home med I will START or STAY ON the medications listed below when I get home from the hospital:    Mobic 15 mg oral tablet  -- 1 tab(s) by mouth once a day (take with food)  -- Do not take this drug if you are pregnant.  Obtain medical advice before taking any non-prescription drugs as some may affect the action of this medication.  Take with food or milk.    -- Indication: For Pain med     Percocet 5/325 oral tablet  -- 1-2 tab(s) by mouth every 6 hours AS NEEDED for moderate to severe pain  begin tapering off as pain decreases  MDD:EIGHT TABS  -- Caution federal law prohibits the transfer of this drug to any person other  than the person for whom it was prescribed.  May cause drowsiness.  Alcohol may intensify this effect.  Use care when operating dangerous machinery.  This prescription cannot be refilled.  This product contains acetaminophen.  Do not use  with any other product containing acetaminophen to prevent possible liver damage.  Using more of this medication than prescribed may cause serious breathing problems.    -- Indication: For Pain med     traMADol 50 mg oral tablet  -- 0.5 tab(s) by mouth 3 times a day   begin tapering off as pain decreases  MDD:75mg total per day  -- Indication: For Pain med     aspirin 325 mg oral delayed release tablet  -- 1 tab(s) by mouth 2 times a day (with meals)   -- Indication: For DVT Prophylaxis     atorvastatin 10 mg oral tablet  -- 1 tab(s) by mouth once a day in am reviewed with patient on 8/29/17 continues on dose  -- Indication: For Home med    amLODIPine-benazepril 5 mg-20 mg oral capsule  -- 1 cap(s) by mouth once a day in am reviewed with patient continues on dose as prescribe 8/29/17  -- Indication: For Home med    Pepcid 20 mg oral tablet  -- 1 tab(s) by mouth 2 times a day   -- It is very important that you take or use this exactly as directed.  Do not skip doses or discontinue unless directed by your doctor.  Obtain medical advice before taking any non-prescription drugs as some may affect the action of this medication.    -- Indication: For supplement     senna oral tablet  -- 2 tab(s) by mouth once a day (at bedtime)  over the counter for constipation from pain meds   -- Indication: For stool softener     docusate sodium 100 mg oral capsule  -- 1 cap(s) by mouth 3 times a day  over the counter for constipation from pain meds   -- Indication: For stool softener    Lumigan 0.01% ophthalmic solution  -- 1 drop(s) to each affected eye once a day (in the evening)   -- Indication: For Home med

## 2017-09-11 NOTE — DISCHARGE NOTE ADULT - HOSPITAL COURSE
73yo is s/p left total hip arthroplasty on 9/11/17  without any intraoperative complications.  Pt is doing well and stable for discharge.  Pt is tolerating physical therapy: WBAT with cane/walker if needed, ANTERIOR TOTAL HIP PRECAUTIONS, gait training.  Leave Aquacel dressing on until post op office visit(POD#14).   Pt is on enteric coated ASA 325mg PO BID for DVT prophylaxis take for 6 weeks unless otherwise directed by surgeon.  follow up with Dr. Mckenna  in two weeks.  Follow up with primary care doctor in 1-2 weeks for continuity of care. 73yo female is s/p Left total hip arthroplasty 9/11/2017 without any intraoperative complications.  Patient is doing well and stable for discharge.  Patient is tolerating physical therapy: weight bearing to Left leg, gait training, STRICT ANTERIOR HIP PRECAUTIONS.  If applicable, keep Aquacel dressing on as is until day of staple removal.  Staples/sutures to be removed 14 days from date of surgery.  Patient is on Aspirin (MUST TAKE WITH FOOD) for anticoagulation.  Orthopaedic Surgeon Dr. Mckenna would like patient to call private MD/Internist for appointment postop to maintain continuity of care.  Follow up with Dr. Mckenna's office in 1-2 weeks.    Istop reviewed 71yo female is s/p Left total hip arthroplasty 9/11/2017 without any intraoperative complications.  Patient is doing well and stable for discharge.  Patient is tolerating physical therapy: weight bearing to Left leg, gait training, STRICT ANTERIOR HIP PRECAUTIONS.  If applicable, keep Aquacel dressing on as is until day of staple removal.  Staples/sutures to be removed 14 days from date of surgery.  Patient is on Aspirin (MUST TAKE WITH FOOD) for anticoagulation.  Orthopaedic Surgeon Dr. Mckenna would like patient to call private MD/Internist for appointment postop to maintain continuity of care.  Follow up with Dr. Mckenna's office in 1-2 weeks.    Istop reviewed Reference #: [id number]

## 2017-09-11 NOTE — PHYSICAL THERAPY INITIAL EVALUATION ADULT - RANGE OF MOTION EXAMINATION, REHAB EVAL
L hip/L: 0-50/Right LE ROM was WFL (within functional limits)/bilateral upper extremity ROM was WFL (within functional limits)/deficits as listed below

## 2017-09-11 NOTE — DISCHARGE NOTE ADULT - CONDITIONS AT DISCHARGE
Pt. is afebrile and offers no complaints. In no acute distress. Left hip  aquacel dressing: clean, dry and intact. Pt is ambulating with a walker, tolerating diet well, and voiding in adequate amounts.

## 2017-09-11 NOTE — DISCHARGE NOTE ADULT - CARE PROVIDER_API CALL
Information to the patient, verbalized understanding.  Labs sent to patient.         Emiliano Mckenna), Orthopaedic Surgery  [address] [address]  Phone: [phone]  Fax: [phone]

## 2017-09-12 DIAGNOSIS — I10 ESSENTIAL (PRIMARY) HYPERTENSION: ICD-10-CM

## 2017-09-12 DIAGNOSIS — H40.9 UNSPECIFIED GLAUCOMA: ICD-10-CM

## 2017-09-12 DIAGNOSIS — E78.4 OTHER HYPERLIPIDEMIA: ICD-10-CM

## 2017-09-12 DIAGNOSIS — M16.12 UNILATERAL PRIMARY OSTEOARTHRITIS, LEFT HIP: ICD-10-CM

## 2017-09-12 LAB
BUN SERPL-MCNC: 17 MG/DL — SIGNIFICANT CHANGE UP (ref 7–23)
CALCIUM SERPL-MCNC: 8.8 MG/DL — SIGNIFICANT CHANGE UP (ref 8.4–10.5)
CHLORIDE SERPL-SCNC: 98 MMOL/L — SIGNIFICANT CHANGE UP (ref 98–107)
CO2 SERPL-SCNC: 21 MMOL/L — LOW (ref 22–31)
CREAT SERPL-MCNC: 0.8 MG/DL — SIGNIFICANT CHANGE UP (ref 0.5–1.3)
GLUCOSE SERPL-MCNC: 161 MG/DL — HIGH (ref 70–99)
HCT VFR BLD CALC: 31 % — LOW (ref 34.5–45)
HGB BLD-MCNC: 10.1 G/DL — LOW (ref 11.5–15.5)
MCHC RBC-ENTMCNC: 28.9 PG — SIGNIFICANT CHANGE UP (ref 27–34)
MCHC RBC-ENTMCNC: 32.6 % — SIGNIFICANT CHANGE UP (ref 32–36)
MCV RBC AUTO: 88.8 FL — SIGNIFICANT CHANGE UP (ref 80–100)
NRBC # FLD: 0 — SIGNIFICANT CHANGE UP
PLATELET # BLD AUTO: 188 K/UL — SIGNIFICANT CHANGE UP (ref 150–400)
PMV BLD: 10.1 FL — SIGNIFICANT CHANGE UP (ref 7–13)
POTASSIUM SERPL-MCNC: 4.5 MMOL/L — SIGNIFICANT CHANGE UP (ref 3.5–5.3)
POTASSIUM SERPL-SCNC: 4.5 MMOL/L — SIGNIFICANT CHANGE UP (ref 3.5–5.3)
RBC # BLD: 3.49 M/UL — LOW (ref 3.8–5.2)
RBC # FLD: 13.2 % — SIGNIFICANT CHANGE UP (ref 10.3–14.5)
SODIUM SERPL-SCNC: 137 MMOL/L — SIGNIFICANT CHANGE UP (ref 135–145)
WBC # BLD: 8.83 K/UL — SIGNIFICANT CHANGE UP (ref 3.8–10.5)
WBC # FLD AUTO: 8.83 K/UL — SIGNIFICANT CHANGE UP (ref 3.8–10.5)

## 2017-09-12 PROCEDURE — 99238 HOSP IP/OBS DSCHRG MGMT 30/<: CPT

## 2017-09-12 PROCEDURE — 99223 1ST HOSP IP/OBS HIGH 75: CPT | Mod: AI

## 2017-09-12 RX ORDER — FAMOTIDINE 10 MG/ML
1 INJECTION INTRAVENOUS
Qty: 90 | Refills: 0 | OUTPATIENT
Start: 2017-09-12 | End: 2017-10-27

## 2017-09-12 RX ORDER — MELOXICAM 15 MG/1
1 TABLET ORAL
Qty: 30 | Refills: 0 | OUTPATIENT
Start: 2017-09-12 | End: 2017-10-12

## 2017-09-12 RX ORDER — ASPIRIN/CALCIUM CARB/MAGNESIUM 324 MG
1 TABLET ORAL
Qty: 84 | Refills: 0 | OUTPATIENT
Start: 2017-09-12 | End: 2017-10-24

## 2017-09-12 RX ORDER — TRAMADOL HYDROCHLORIDE 50 MG/1
0.5 TABLET ORAL
Qty: 15 | Refills: 0 | OUTPATIENT
Start: 2017-09-12 | End: 2017-09-22

## 2017-09-12 RX ADMIN — Medication 100 MILLIGRAM(S): at 14:11

## 2017-09-12 RX ADMIN — SODIUM CHLORIDE 3 MILLILITER(S): 9 INJECTION INTRAMUSCULAR; INTRAVENOUS; SUBCUTANEOUS at 13:59

## 2017-09-12 RX ADMIN — PANTOPRAZOLE SODIUM 40 MILLIGRAM(S): 20 TABLET, DELAYED RELEASE ORAL at 14:11

## 2017-09-12 RX ADMIN — OXYCODONE HYDROCHLORIDE 5 MILLIGRAM(S): 5 TABLET ORAL at 07:04

## 2017-09-12 RX ADMIN — SODIUM CHLORIDE 1000 MILLILITER(S): 9 INJECTION INTRAMUSCULAR; INTRAVENOUS; SUBCUTANEOUS at 07:01

## 2017-09-12 RX ADMIN — Medication 325 MILLIGRAM(S): at 05:24

## 2017-09-12 RX ADMIN — OXYCODONE HYDROCHLORIDE 5 MILLIGRAM(S): 5 TABLET ORAL at 11:30

## 2017-09-12 RX ADMIN — OXYCODONE HYDROCHLORIDE 5 MILLIGRAM(S): 5 TABLET ORAL at 16:05

## 2017-09-12 RX ADMIN — TRAMADOL HYDROCHLORIDE 25 MILLIGRAM(S): 50 TABLET ORAL at 14:11

## 2017-09-12 RX ADMIN — Medication 102 MILLIGRAM(S): at 05:25

## 2017-09-12 RX ADMIN — Medication 100 MILLIGRAM(S): at 03:52

## 2017-09-12 RX ADMIN — Medication 650 MILLIGRAM(S): at 14:11

## 2017-09-12 RX ADMIN — Medication 650 MILLIGRAM(S): at 05:24

## 2017-09-12 RX ADMIN — OXYCODONE HYDROCHLORIDE 5 MILLIGRAM(S): 5 TABLET ORAL at 17:00

## 2017-09-12 RX ADMIN — OXYCODONE HYDROCHLORIDE 5 MILLIGRAM(S): 5 TABLET ORAL at 10:33

## 2017-09-12 RX ADMIN — OXYCODONE HYDROCHLORIDE 5 MILLIGRAM(S): 5 TABLET ORAL at 05:57

## 2017-09-12 RX ADMIN — Medication 100 MILLIGRAM(S): at 05:24

## 2017-09-12 RX ADMIN — SODIUM CHLORIDE 3 MILLILITER(S): 9 INJECTION INTRAMUSCULAR; INTRAVENOUS; SUBCUTANEOUS at 05:22

## 2017-09-12 NOTE — OCCUPATIONAL THERAPY INITIAL EVALUATION ADULT - NS ASR DRESSING EQUIP NEEDS
Pt. reports she already owns Hip Kit. Pt to be re-educated on the use of the reacher, dressing stick, sock aid, and long handled shoe horn using compensatory strategies during upcoming ADL session.

## 2017-09-12 NOTE — OCCUPATIONAL THERAPY INITIAL EVALUATION ADULT - ADDITIONAL COMMENTS
Pt. reports she owns a 3:1 commode and Hip Kit from Right Hip Replacement 4/2017; however, pt. explains she was not using it anymore prior to this surgery.

## 2017-09-12 NOTE — OCCUPATIONAL THERAPY INITIAL EVALUATION ADULT - MD ORDER
Occupational Therapy to evaluate and treat. Occupational Therapy to evaluate and treat. OOB to Chair. Per YE Garcia, pt is okay to participate in OT evaluation and perform activity as tolerated.

## 2017-09-12 NOTE — CONSULT NOTE ADULT - SUBJECTIVE AND OBJECTIVE BOX
Patient is a 72y old  Female who presents with a chief complaint of elective Left total hip arthroplasty 9/11/2017 (11 Sep 2017 16:19)      HPI:  72 y.o. female w/ hx HTN, HLD, Basal Cell Carcinoma, Glaucoma, osteoarthritis B/L hip x many years, s/p right hip replacement in April 2017 p/w  chronic left hip pain that affects her ability to perform activities of daily living requiring a cane for ambulation now s/p Left Total Hip Replacement Direct Anterior Approach on 9/11/2017.           Pain Symptoms if applicable:             	                         none	   mild         moderate         severe  Pain:	                            0	    1-3	     4-6	         7-10  Location:	  Modifying factors:	  Associated symptoms:	    Function: [ ] Independent  [ ] Assistance  [ ] Total care  [ ] Non-ambulatory    Allergies    penicillin (Hives)  shellfish (Hives)    Intolerances        HOME MEDICATIONS: [x ] Reviewed  amLODIPine-benazepril 5 mg-20 mg oral capsule: Last Dose Taken:  , 1 cap(s) orally once a day in am reviewed with patient continues on dose as prescribe 8/29/17  atorvastatin 10 mg oral tablet: Last Dose Taken:  , 1 tab(s) orally once a day in am reviewed with patient on 8/29/17 continues on dose  Lumigan 0.01% ophthalmic solution: Last Dose Taken:  , 1 drop(s) to each affected eye once a day (in the evening) reviewed with patient on 8/29/17  continues as prescribed  Tylenol 8 Hour 650 mg oral tablet, extended release: Last Dose Taken:  , 2 tab(s) orally once a day    MEDICATIONS  (STANDING):  sodium chloride 0.9% lock flush 3 milliLiter(s) IV Push every 8 hours  atorvastatin 10 milliGRAM(s) Oral at bedtime  latanoprost 0.005% Ophthalmic Solution 1 Drop(s) Both EYES at bedtime  lactated ringers. 1000 milliLiter(s) (100 mL/Hr) IV Continuous <Continuous>  aspirin enteric coated 325 milliGRAM(s) Oral two times a day  acetaminophen   Tablet 650 milliGRAM(s) Oral every 6 hours  pantoprazole    Tablet 40 milliGRAM(s) Oral daily  polyethylene glycol 3350 17 Gram(s) Oral daily  senna 2 Tablet(s) Oral at bedtime  docusate sodium 100 milliGRAM(s) Oral three times a day  traMADol 25 milliGRAM(s) Oral three times a day  ketorolac   Injectable 15 milliGRAM(s) IV Push every 6 hours  amLODIPine   Tablet 5 milliGRAM(s) Oral daily  lisinopril 20 milliGRAM(s) Oral daily    MEDICATIONS  (PRN):  naloxone Injectable 0.1 milliGRAM(s) IV Push every 3 minutes PRN For ANY of the following changes in patient status:  A. RR LESS THAN 10 breaths per minute, B. Oxygen saturation LESS THAN 90%, C. Sedation score of 6  acetaminophen   Tablet 650 milliGRAM(s) Oral every 6 hours PRN For Temp over 38.3 C (100.94 F)  oxyCODONE    IR 5 milliGRAM(s) Oral every 4 hours PRN mild and moderate pain  oxyCODONE    IR 10 milliGRAM(s) Oral every 4 hours PRN Severe Pain (7 - 10)  aluminum hydroxide/magnesium hydroxide/simethicone Suspension 30 milliLiter(s) Oral four times a day PRN Indigestion  ondansetron Injectable 4 milliGRAM(s) IV Push every 6 hours PRN Nausea and/or Vomiting  morphine  - Injectable 2 milliGRAM(s) IV Push every 4 hours PRN Brealthrough pain      PAST MEDICAL & SURGICAL HISTORY:  Unilateral primary osteoarthritis, left hip  Lymph node cancer: s/p lymph node dissection &amp; chemotherapy (2012)  Cataract: s/p surgery 2016  Skin cancer: basal cell carcinoma  Hyperlipidemia  Glaucoma  Hypertension  History of ear surgery: and nose surgery from skin CA  Pilonidal cyst: s/p extraction in 1962  History of hip replacement: right March 2017  History of lymph node dissection of left axilla: 2012  Cataract: s/p cataract surgery (2016)  History of skin cancer: s/p melanomas removal 2007  [ x] Reviewed     SOCIAL HISTORY:  Residence: [ ] [facility]  [ ] [facility]  [x ] Community  [x ] Substance abuse: none  [x ] Tobacco: none  [x ] Alcohol use: none    FAMILY HISTORY:  Family history of heart disease (Father, Mother)  Family history of diabetes mellitus (Grandparent)      REVIEW OF SYSTEMS:    CONSTITUTIONAL: No fever, weight loss, or fatigue  EYES: No eye pain, visual disturbances, or discharge  ENMT:  No difficulty hearing, tinnitus, vertigo; No sinus or throat pain  NECK: No pain or stiffness  BREASTS: No pain, masses, or nipple discharge  RESPIRATORY: No cough, wheezing, chills or hemoptysis; No shortness of breath  CARDIOVASCULAR: No chest pain, palpitations, dizziness, or leg swelling  GASTROINTESTINAL: No abdominal or epigastric pain. No nausea, vomiting, or hematemesis; No diarrhea or constipation. No melena or hematochezia.  GENITOURINARY: No dysuria, frequency, hematuria, or incontinence  NEUROLOGICAL: No headaches, memory loss, loss of strength, numbness, or tremors  SKIN: No itching, burning, rashes, or lesions   LYMPH NODES: No enlarged glands  ENDOCRINE: No heat or cold intolerance; No hair loss  MUSCULOSKELETAL: No muscle or back pain  PSYCHIATRIC: No depression, anxiety, mood swings, or difficulty sleeping  HEME/LYMPH: No easy bruising, or bleeding gums  ALLERGY AND IMMUNOLOGIC: No hives or eczema    [x  ] All other ROS negative  [  ] Unable to obtain due to poor mental status    Vital Signs Last 24 Hrs  T(C): 36.7 (12 Sep 2017 10:30), Max: 37.2 (12 Sep 2017 05:21)  T(F): 98.1 (12 Sep 2017 10:30), Max: 99 (12 Sep 2017 05:21)  HR: 85 (12 Sep 2017 10:30) (68 - 94)  BP: 113/56 (12 Sep 2017 10:30) (100/56 - 127/62)  BP(mean): --  RR: 16 (12 Sep 2017 10:30) (12 - 17)  SpO2: 99% (12 Sep 2017 10:30) (96% - 100%)    PHYSICAL EXAM:    GENERAL: NAD, well-groomed, well-developed  HEAD:  Atraumatic, Normocephalic  EYES: EOMI, PERRLA, conjunctiva and sclera clear  ENMT: Moist mucous membranes  NECK: Supple, No JVD  RESPIRATORY: Clear to auscultation bilaterally; No rales, rhonchi, wheezing, or rubs  CARDIOVASCULAR: Regular rate and rhythm; No murmurs, rubs, or gallops  GASTROINTESTINAL: Soft, Nontender, Nondistended; Bowel sounds present  GENITOURINARY: Not examined  EXTREMITIES:  2+ Peripheral Pulses, No clubbing, cyanosis, or edema  NERVOUS SYSTEM:  Alert & Oriented X3; Moving all 4 extremities; No gross sensory deficits  HEME/LYMPH: No lymphadenopathy noted  SKIN: No rashes or lesions; Incisions C/D/I    LABS:                        10.1   8.83  )-----------( 188      ( 12 Sep 2017 06:00 )             31.0     09-12    137  |  98  |  17  ----------------------------<  161<H>  4.5   |  21<L>  |  0.80    Ca    8.8      12 Sep 2017 06:00          CAPILLARY BLOOD GLUCOSE          RADIOLOGY & ADDITIONAL STUDIES:    EKG:   Personally Reviewed:  [ ] YES     Imaging: < from: Xray Hip w/ Pelvis 1 View, Left (09.11.17 @ 13:20) >  Cementless left total hip prosthesis with screw fixated acetabular cup   implanted.    Intact and aligned hardware and no periprosthetic fractures.    Postoperative soft tissue changes.    Correlate with intraoperative findings.    Intact aligned and uncomplicated cementless right total hip prosthesis   with screw fixated acetabular cup.  Focal small heterotopic ossification adjacent to right greater trochanter   superior margin.  Unremarkable remaining visualized pelvic osseous structures.    < end of copied text >    Personally Reviewed:  [ ] YES               Consultant(s) notes reviewed:    Care Discussed with Consultant(s)/Other Providers: Ortho Patient is a 72y old  Female who presents with a chief complaint of elective Left total hip arthroplasty 9/11/2017 (11 Sep 2017 16:19)      HPI:  72 y.o. female w/ hx HTN, HLD, Basal Cell Carcinoma/melanoma, s/p resection, Glaucoma, osteoarthritis B/L hip x many years, s/p right hip replacement in April 2017 p/w  chronic left hip pain that affects her ability to perform activities of daily living requiring a cane for ambulation now s/p Left Total Hip Replacement Direct Anterior Approach on 9/11/2017. Patient feels well. Left hip pain controlled with pain regimen. He denies cp, SOB, abdominal pain, N/V/D           Pain Symptoms if applicable:             	                         none	   mild         moderate         severe  Pain:4	                            0	    1-3	     4-6	         7-10  Location: left hip	  Modifying factors: movement	  Associated symptoms:	    Function: [x ] Independent  [ ] Assistance  [ ] Total care  [ ] Non-ambulatory    Allergies    penicillin (Hives)  shellfish (Hives)    Intolerances        HOME MEDICATIONS: [x ] Reviewed  amLODIPine-benazepril 5 mg-20 mg oral capsule: Last Dose Taken:  , 1 cap(s) orally once a day in am reviewed with patient continues on dose as prescribe 8/29/17  atorvastatin 10 mg oral tablet: Last Dose Taken:  , 1 tab(s) orally once a day in am reviewed with patient on 8/29/17 continues on dose  Lumigan 0.01% ophthalmic solution: Last Dose Taken:  , 1 drop(s) to each affected eye once a day (in the evening) reviewed with patient on 8/29/17  continues as prescribed  Tylenol 8 Hour 650 mg oral tablet, extended release: Last Dose Taken:  , 2 tab(s) orally once a day    MEDICATIONS  (STANDING):  sodium chloride 0.9% lock flush 3 milliLiter(s) IV Push every 8 hours  atorvastatin 10 milliGRAM(s) Oral at bedtime  latanoprost 0.005% Ophthalmic Solution 1 Drop(s) Both EYES at bedtime  lactated ringers. 1000 milliLiter(s) (100 mL/Hr) IV Continuous <Continuous>  aspirin enteric coated 325 milliGRAM(s) Oral two times a day  acetaminophen   Tablet 650 milliGRAM(s) Oral every 6 hours  pantoprazole    Tablet 40 milliGRAM(s) Oral daily  polyethylene glycol 3350 17 Gram(s) Oral daily  senna 2 Tablet(s) Oral at bedtime  docusate sodium 100 milliGRAM(s) Oral three times a day  traMADol 25 milliGRAM(s) Oral three times a day  ketorolac   Injectable 15 milliGRAM(s) IV Push every 6 hours  amLODIPine   Tablet 5 milliGRAM(s) Oral daily  lisinopril 20 milliGRAM(s) Oral daily    MEDICATIONS  (PRN):  naloxone Injectable 0.1 milliGRAM(s) IV Push every 3 minutes PRN For ANY of the following changes in patient status:  A. RR LESS THAN 10 breaths per minute, B. Oxygen saturation LESS THAN 90%, C. Sedation score of 6  acetaminophen   Tablet 650 milliGRAM(s) Oral every 6 hours PRN For Temp over 38.3 C (100.94 F)  oxyCODONE    IR 5 milliGRAM(s) Oral every 4 hours PRN mild and moderate pain  oxyCODONE    IR 10 milliGRAM(s) Oral every 4 hours PRN Severe Pain (7 - 10)  aluminum hydroxide/magnesium hydroxide/simethicone Suspension 30 milliLiter(s) Oral four times a day PRN Indigestion  ondansetron Injectable 4 milliGRAM(s) IV Push every 6 hours PRN Nausea and/or Vomiting  morphine  - Injectable 2 milliGRAM(s) IV Push every 4 hours PRN Brealthrough pain      PAST MEDICAL & SURGICAL HISTORY:  Unilateral primary osteoarthritis, left hip  Lymph node cancer: s/p lymph node dissection &amp; chemotherapy (2012)  Cataract: s/p surgery 2016  Skin cancer: basal cell carcinoma  Hyperlipidemia  Glaucoma  Hypertension  History of ear surgery: and nose surgery from skin CA  Pilonidal cyst: s/p extraction in 1962  History of hip replacement: right March 2017  History of lymph node dissection of left axilla: 2012  Cataract: s/p cataract surgery (2016)  History of skin cancer: s/p melanomas removal 2007  [ x] Reviewed     SOCIAL HISTORY:  Residence: [ ] [facility]  [ ] [facility]  [x ] Community  [x ] Substance abuse: none  [x ] Tobacco: none  [x ] Alcohol use: none    FAMILY HISTORY:  Family history of heart disease (Father, Mother)  Family history of diabetes mellitus (Grandparent)      REVIEW OF SYSTEMS:    CONSTITUTIONAL: No fever, weight loss, or fatigue  EYES: No eye pain, visual disturbances, or discharge  ENMT:  No difficulty hearing, tinnitus, vertigo; No sinus or throat pain  NECK: No pain or stiffness  BREASTS: No pain, masses, or nipple discharge  RESPIRATORY: No cough, wheezing, chills or hemoptysis; No shortness of breath  CARDIOVASCULAR: No chest pain, palpitations, dizziness, or leg swelling  GASTROINTESTINAL: No abdominal or epigastric pain. No nausea, vomiting, or hematemesis; No diarrhea or constipation. No melena or hematochezia.  GENITOURINARY: No dysuria, frequency, hematuria, or incontinence  NEUROLOGICAL: No headaches, memory loss, loss of strength, numbness, or tremors  SKIN: No itching, burning, rashes, or lesions   LYMPH NODES: No enlarged glands  ENDOCRINE: No heat or cold intolerance; No hair loss  MUSCULOSKELETAL: Left hip pain. No muscle or back pain  PSYCHIATRIC: No depression, anxiety, mood swings, or difficulty sleeping  HEME/LYMPH: No easy bruising, or bleeding gums  ALLERGY AND IMMUNOLOGIC: No hives or eczema    [x  ] All other ROS negative  [  ] Unable to obtain due to poor mental status    Vital Signs Last 24 Hrs  T(C): 36.7 (12 Sep 2017 10:30), Max: 37.2 (12 Sep 2017 05:21)  T(F): 98.1 (12 Sep 2017 10:30), Max: 99 (12 Sep 2017 05:21)  HR: 85 (12 Sep 2017 10:30) (68 - 94)  BP: 113/56 (12 Sep 2017 10:30) (100/56 - 127/62)  BP(mean): --  RR: 16 (12 Sep 2017 10:30) (12 - 17)  SpO2: 99% (12 Sep 2017 10:30) (96% - 100%)    PHYSICAL EXAM:    GENERAL: NAD, well-groomed, well-developed  HEAD:  Atraumatic, Normocephalic  EYES: EOMI, PERRLA, conjunctiva and sclera clear  ENMT: Moist mucous membranes  NECK: Supple, No JVD  RESPIRATORY: Clear to auscultation bilaterally; No rales, rhonchi, wheezing, or rubs  CARDIOVASCULAR: Regular rate and rhythm; No murmurs, rubs, or gallops  GASTROINTESTINAL: Soft, Nontender, Nondistended; Bowel sounds present  GENITOURINARY: Not examined  EXTREMITIES:  2+ Peripheral Pulses, No clubbing, cyanosis, or edema  NERVOUS SYSTEM:  Alert & Oriented X3; Moving all 4 extremities; No gross sensory deficits  HEME/LYMPH: No lymphadenopathy noted  SKIN: No rashes or lesions; Incisions C/D/I    LABS:                        10.1   8.83  )-----------( 188      ( 12 Sep 2017 06:00 )             31.0     09-12    137  |  98  |  17  ----------------------------<  161<H>  4.5   |  21<L>  |  0.80    Ca    8.8      12 Sep 2017 06:00          CAPILLARY BLOOD GLUCOSE          RADIOLOGY & ADDITIONAL STUDIES:    EKG:   Personally Reviewed:  [ ] YES     Imaging: < from: Xray Hip w/ Pelvis 1 View, Left (09.11.17 @ 13:20) >  Cementless left total hip prosthesis with screw fixated acetabular cup   implanted.    Intact and aligned hardware and no periprosthetic fractures.    Postoperative soft tissue changes.    Correlate with intraoperative findings.    Intact aligned and uncomplicated cementless right total hip prosthesis   with screw fixated acetabular cup.  Focal small heterotopic ossification adjacent to right greater trochanter   superior margin.  Unremarkable remaining visualized pelvic osseous structures.    < end of copied text >    Personally Reviewed:  [ ] YES               Consultant(s) notes reviewed:    Care Discussed with Consultant(s)/Other Providers: Ortho

## 2017-09-12 NOTE — CONSULT NOTE ADULT - ASSESSMENT
72 y.o. female w/ hx HTN, HLD, Basal Cell Carcinoma/melanoma, s/p resection, Glaucoma, osteoarthritis B/L hips, /p right hip replacement in April 2017 p/w  chronic left hip pain now s/p Left Total Hip Replacement Direct Anterior Approach on 9/11/2017.

## 2017-09-12 NOTE — OCCUPATIONAL THERAPY INITIAL EVALUATION ADULT - PERTINENT HX OF CURRENT PROBLEM, REHAB EVAL
Pt is a 72 year old female with PMHx of HTN, HLD, Basal Cell Carcinoma and Glaucoma, with hx of bilateral hip pain for many years and s/p right hip replacement April 2017. Pt. c/o chronic left hip pain that affects her ability to perform activities of daily living. Pt with diagnosis of unilateral primary osteoarthritis, left hip. Pt is now s/p Left Total Hip Replacement on 9/11/2017.

## 2017-09-12 NOTE — PROGRESS NOTE ADULT - ASSESSMENT
72F s/p L ODETTE POD#1  -pain control  -DVT ppx  -WBAT  -PT/OOB  -f/u labs  -IS  -Abx x 24 hrs  -Ant Hip Prec  -d/c planning     [place] [phone]

## 2017-09-12 NOTE — PROGRESS NOTE ADULT - SUBJECTIVE AND OBJECTIVE BOX
ANESTHESIA POSTOP CHECK    72y Female POSTOP DAY 1 S/P     Vital Signs Last 24 Hrs  T(C): 36.7 (12 Sep 2017 10:30), Max: 37.2 (12 Sep 2017 05:21)  T(F): 98.1 (12 Sep 2017 10:30), Max: 99 (12 Sep 2017 05:21)  HR: 85 (12 Sep 2017 10:30) (68 - 85)  BP: 113/56 (12 Sep 2017 10:30) (100/56 - 113/56)  BP(mean): --  RR: 16 (12 Sep 2017 10:30) (16 - 17)  SpO2: 99% (12 Sep 2017 10:30) (97% - 100%)  I&O's Summary    11 Sep 2017 07:01  -  12 Sep 2017 07:00  --------------------------------------------------------  IN: 0 mL / OUT: 400 mL / NET: -400 mL        [X ] NO APPARENT ANESTHESIA COMPLICATIONS      Comments: Moving lower extremities and no evidence of residual nerve block or problems.
Anesthesia Pain Management Service    SUBJECTIVE: Patient s/p spinal morphine with pain managable and no problems.  Pain Scale Score:  Refer to charted pain scores    THERAPY:    s/p spinal PF morphine yesterday.      MEDICATIONS  (STANDING):  sodium chloride 0.9% lock flush 3 milliLiter(s) IV Push every 8 hours  atorvastatin 10 milliGRAM(s) Oral at bedtime  latanoprost 0.005% Ophthalmic Solution 1 Drop(s) Both EYES at bedtime  lactated ringers. 1000 milliLiter(s) (100 mL/Hr) IV Continuous <Continuous>  aspirin enteric coated 325 milliGRAM(s) Oral two times a day  acetaminophen   Tablet 650 milliGRAM(s) Oral every 6 hours  pantoprazole    Tablet 40 milliGRAM(s) Oral daily  polyethylene glycol 3350 17 Gram(s) Oral daily  senna 2 Tablet(s) Oral at bedtime  docusate sodium 100 milliGRAM(s) Oral three times a day  traMADol 25 milliGRAM(s) Oral three times a day  ketorolac   Injectable 15 milliGRAM(s) IV Push every 6 hours  amLODIPine   Tablet 5 milliGRAM(s) Oral daily  lisinopril 20 milliGRAM(s) Oral daily    MEDICATIONS  (PRN):  naloxone Injectable 0.1 milliGRAM(s) IV Push every 3 minutes PRN For ANY of the following changes in patient status:  A. RR LESS THAN 10 breaths per minute, B. Oxygen saturation LESS THAN 90%, C. Sedation score of 6  acetaminophen   Tablet 650 milliGRAM(s) Oral every 6 hours PRN For Temp over 38.3 C (100.94 F)  oxyCODONE    IR 5 milliGRAM(s) Oral every 4 hours PRN mild and moderate pain  oxyCODONE    IR 10 milliGRAM(s) Oral every 4 hours PRN Severe Pain (7 - 10)  aluminum hydroxide/magnesium hydroxide/simethicone Suspension 30 milliLiter(s) Oral four times a day PRN Indigestion  ondansetron Injectable 4 milliGRAM(s) IV Push every 6 hours PRN Nausea and/or Vomiting  morphine  - Injectable 2 milliGRAM(s) IV Push every 4 hours PRN Brealthrough pain      OBJECTIVE:    Sedation Score:	[ x] Alert	[ ] Drowsy	[ ] Arousable	[ ] Asleep	[ ] Unresponsive    Side Effects:	[ x] None	[ ] Nausea	[ ] Vomiting	[ ] Pruritus  		  [ ] Weakness		[ ] Numbness	[ ] Other:    Vital Signs Last 24 Hrs  T(C): 36.7 (12 Sep 2017 10:30), Max: 37.2 (12 Sep 2017 05:21)  T(F): 98.1 (12 Sep 2017 10:30), Max: 99 (12 Sep 2017 05:21)  HR: 85 (12 Sep 2017 10:30) (68 - 85)  BP: 113/56 (12 Sep 2017 10:30) (100/56 - 113/56)  BP(mean): --  RR: 16 (12 Sep 2017 10:30) (16 - 17)  SpO2: 99% (12 Sep 2017 10:30) (97% - 100%)    ASSESSMENT/ PLAN  [x ] Patient transitioned to prn analgesics  [x] Pain management per primary service, pain service to sign off   [x]Documentation and Verification of current medications     Comments: PRN opioids or adjuvant medication to be given.    Patient was seen 09-12-17 @ 17:21
No acute o/n events.  Pain controlled    Vital Signs Last 24 Hrs  T(C): 37.2 (12 Sep 2017 05:21), Max: 37.2 (12 Sep 2017 05:21)  T(F): 99 (12 Sep 2017 05:21), Max: 99 (12 Sep 2017 05:21)  HR: 73 (12 Sep 2017 05:21) (68 - 94)  BP: 101/59 (12 Sep 2017 05:21) (100/56 - 159/75)  BP(mean): --  RR: 17 (12 Sep 2017 05:21) (12 - 18)  SpO2: 100% (12 Sep 2017 05:21) (96% - 100%)    LLE: Dressing c/d/i  EHL/FHL/GS/TA intact  S/S/DP/SP/T SILT  BCR, soft compartments
Patient is seen and examined at bedside. Denies CP/SOB/Dizziness/N/V/D/HA. Pain is controlled.     Vital Signs Last 24 Hrs  T(C): 36.3 (11 Sep 2017 15:15), Max: 36.8 (11 Sep 2017 10:19)  T(F): 97.4 (11 Sep 2017 15:15), Max: 98.2 (11 Sep 2017 10:19)  HR: 84 (11 Sep 2017 15:15) (81 - 94)  BP: 108/69 (11 Sep 2017 15:15) (108/69 - 159/75)  BP(mean): --  RR: 16 (11 Sep 2017 15:15) (12 - 18)  SpO2: 98% (11 Sep 2017 15:15) (96% - 100%)    GEN: NAD    LLE: Dressing C/D/I.   LLE: Motor intact 5/5 EHL/FHL/TA/GS in the LLE. Sensation is grossly intact distal. Extremities are warm. Compartments are soft and NT. DP 2+ LLE.     Labs:                          11.1   10.87 )-----------( 204      ( 11 Sep 2017 14:24 )             34.0       09-11    143  |  103  |  17  ----------------------------<  143<H>  4.3   |  24  |  0.96    Ca    9.3      11 Sep 2017 14:24        A/P: Patient is a 72y y/o Female s/p left total hip arthroplasty , POD # 0    -Pain control/analgesia  -Inc spirometry  -Venodynes/foot pumps  -F/U AM Labs  -PT/OT/WBAT  -Antibiotic- ancef  -Anticoagulation- asa bid

## 2017-09-13 VITALS
SYSTOLIC BLOOD PRESSURE: 139 MMHG | HEART RATE: 83 BPM | TEMPERATURE: 98 F | DIASTOLIC BLOOD PRESSURE: 78 MMHG | RESPIRATION RATE: 17 BRPM | OXYGEN SATURATION: 96 % | WEIGHT: 214.95 LBS

## 2017-09-15 ENCOUNTER — TRANSCRIPTION ENCOUNTER (OUTPATIENT)
Age: 72
End: 2017-09-15

## 2017-09-18 LAB — SURGICAL PATHOLOGY STUDY: SIGNIFICANT CHANGE UP

## 2017-09-29 ENCOUNTER — APPOINTMENT (OUTPATIENT)
Dept: ORTHOPEDIC SURGERY | Facility: CLINIC | Age: 72
End: 2017-09-29
Payer: MEDICARE

## 2017-09-29 VITALS
BODY MASS INDEX: 28 KG/M2 | HEIGHT: 64 IN | WEIGHT: 164 LBS | HEART RATE: 77 BPM | SYSTOLIC BLOOD PRESSURE: 138 MMHG | DIASTOLIC BLOOD PRESSURE: 81 MMHG

## 2017-09-29 PROCEDURE — 73502 X-RAY EXAM HIP UNI 2-3 VIEWS: CPT | Mod: LT

## 2017-09-29 PROCEDURE — 99024 POSTOP FOLLOW-UP VISIT: CPT

## 2017-11-10 ENCOUNTER — APPOINTMENT (OUTPATIENT)
Dept: ORTHOPEDIC SURGERY | Facility: CLINIC | Age: 72
End: 2017-11-10
Payer: MEDICARE

## 2017-11-10 VITALS
WEIGHT: 164 LBS | HEIGHT: 64 IN | BODY MASS INDEX: 28 KG/M2 | DIASTOLIC BLOOD PRESSURE: 69 MMHG | SYSTOLIC BLOOD PRESSURE: 128 MMHG | HEART RATE: 86 BPM

## 2017-11-10 PROCEDURE — 99024 POSTOP FOLLOW-UP VISIT: CPT

## 2017-12-08 ENCOUNTER — APPOINTMENT (OUTPATIENT)
Dept: ORTHOPEDIC SURGERY | Facility: CLINIC | Age: 72
End: 2017-12-08
Payer: MEDICARE

## 2017-12-08 VITALS
DIASTOLIC BLOOD PRESSURE: 78 MMHG | HEART RATE: 80 BPM | WEIGHT: 164 LBS | SYSTOLIC BLOOD PRESSURE: 133 MMHG | HEIGHT: 64 IN | BODY MASS INDEX: 28 KG/M2

## 2017-12-08 PROCEDURE — 72100 X-RAY EXAM L-S SPINE 2/3 VWS: CPT

## 2017-12-08 PROCEDURE — 99214 OFFICE O/P EST MOD 30 MIN: CPT | Mod: 24

## 2017-12-11 ENCOUNTER — APPOINTMENT (OUTPATIENT)
Dept: ORTHOPEDIC SURGERY | Facility: CLINIC | Age: 72
End: 2017-12-11
Payer: MEDICARE

## 2017-12-11 VITALS
HEIGHT: 64 IN | BODY MASS INDEX: 28.17 KG/M2 | SYSTOLIC BLOOD PRESSURE: 133 MMHG | DIASTOLIC BLOOD PRESSURE: 73 MMHG | WEIGHT: 165 LBS | HEART RATE: 80 BPM

## 2017-12-11 PROCEDURE — 72100 X-RAY EXAM L-S SPINE 2/3 VWS: CPT

## 2017-12-11 PROCEDURE — 99215 OFFICE O/P EST HI 40 MIN: CPT

## 2018-01-05 ENCOUNTER — APPOINTMENT (OUTPATIENT)
Dept: ORTHOPEDIC SURGERY | Facility: CLINIC | Age: 73
End: 2018-01-05

## 2018-07-03 NOTE — ASU PATIENT PROFILE, ADULT - CAREGIVER
"Name: Alberto Dangelo  [facility] Number: [id number]  Date of Treatment: 07/03/2018   Diagnosis:   Encounter Diagnoses   Name Primary?   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  Patient repo"I'm tired today". He reports his pain to be n/a/10 on a 0-10 scale with 0 being no pain and 10 being the worst pain imaginable.    Objective    Patient received individual therapy to increase strength, endurance, flexibility, core stabilization and gait quality with 0 patients with activities as follows:     Alberto participated in dynamic functional therapeutic activities to improve functional performance for 67 minutes. Including: Set up with 5 kg unloading.  Pt participated in computer assisted robotic gait training via Anafore @ 3.0 kph x 60 min, 20 sec for a total distance of 2979 m.  Utilized guidance force of 70% x 10 min; decreased to 50% x 10'.  Completed session @ 30% guidance force.  Adjustment to hip position needed to encourage erect posture during stance phase (decreased hip/knee flexion)      Written Home Exercises Provided: Pt performs self stretching in clinic prior to start of session.    Pt demo good understanding of the education provided. Alberto demonstrated good return demonstration of activities.     Assessment:   As patient fatigues he demonstrates increased hip and knee flexion during stance phase that is corrected with anterior displacement of hips with pelvic pad.      Pt will continue to benefit from skilled PT intervention. Medical Necessity is demonstrated by:  Unable to participate fully in daily activities, Weakness and continued gait disturbance    Patient is making steady progress towards established goals.    New/Revised goals: N/A      Plan:  Continue with established Plan of Care towards PT goals.   "
Declines

## 2018-07-16 PROBLEM — C44.90 UNSPECIFIED MALIGNANT NEOPLASM OF SKIN, UNSPECIFIED: Chronic | Status: ACTIVE | Noted: 2017-03-28

## 2018-07-25 PROBLEM — M16.12 PRIMARY LOCALIZED OSTEOARTHROSIS OF PELVIC REGION, LEFT: Status: ACTIVE | Noted: 2017-08-25

## 2018-08-15 PROBLEM — M16.12 UNILATERAL PRIMARY OSTEOARTHRITIS, LEFT HIP: Chronic | Status: ACTIVE | Noted: 2017-08-29

## 2018-08-15 PROBLEM — C96.9 MALIGNANT NEOPLASM OF LYMPHOID, HEMATOPOIETIC AND RELATED TISSUE, UNSPECIFIED: Chronic | Status: ACTIVE | Noted: 2017-03-28

## 2018-08-15 PROBLEM — H26.9 UNSPECIFIED CATARACT: Chronic | Status: ACTIVE | Noted: 2017-03-28

## 2018-08-15 PROBLEM — I10 ESSENTIAL (PRIMARY) HYPERTENSION: Chronic | Status: ACTIVE | Noted: 2017-03-28

## 2018-08-15 PROBLEM — H40.9 UNSPECIFIED GLAUCOMA: Chronic | Status: ACTIVE | Noted: 2017-03-28

## 2018-08-15 PROBLEM — E78.5 HYPERLIPIDEMIA, UNSPECIFIED: Chronic | Status: ACTIVE | Noted: 2017-03-28

## 2018-08-24 ENCOUNTER — APPOINTMENT (OUTPATIENT)
Dept: ORTHOPEDIC SURGERY | Facility: CLINIC | Age: 73
End: 2018-08-24
Payer: MEDICARE

## 2018-08-24 VITALS
BODY MASS INDEX: 28.68 KG/M2 | WEIGHT: 168 LBS | DIASTOLIC BLOOD PRESSURE: 73 MMHG | HEART RATE: 73 BPM | SYSTOLIC BLOOD PRESSURE: 119 MMHG | HEIGHT: 64 IN

## 2018-08-24 PROCEDURE — 73502 X-RAY EXAM HIP UNI 2-3 VIEWS: CPT | Mod: LT

## 2018-08-24 PROCEDURE — 99214 OFFICE O/P EST MOD 30 MIN: CPT

## 2018-08-24 PROCEDURE — 72100 X-RAY EXAM L-S SPINE 2/3 VWS: CPT

## 2019-10-11 ENCOUNTER — APPOINTMENT (OUTPATIENT)
Dept: ORTHOPEDIC SURGERY | Facility: CLINIC | Age: 74
End: 2019-10-11
Payer: MEDICARE

## 2019-10-11 VITALS
WEIGHT: 157 LBS | SYSTOLIC BLOOD PRESSURE: 138 MMHG | BODY MASS INDEX: 26.8 KG/M2 | HEIGHT: 64 IN | DIASTOLIC BLOOD PRESSURE: 78 MMHG | HEART RATE: 79 BPM

## 2019-10-11 PROCEDURE — 99214 OFFICE O/P EST MOD 30 MIN: CPT

## 2019-10-11 NOTE — CONSULT LETTER
[Dear  ___] : Dear  [unfilled], [Consult Letter:] : I had the pleasure of evaluating your patient, [unfilled]. [Please see my note below.] : Please see my note below. [Consult Closing:] : Thank you very much for allowing me to participate in the care of this patient.  If you have any questions, please do not hesitate to contact me. [Sincerely,] : Sincerely, [FreeTextEntry2] : PATRICE WELCH\par  [FreeTextEntry3] : Emiliano Mckenna MD\par \par ______________________________________________\par [facility]: Hip/Knee Arthroplasty\par [address], [address], [place] [address]\par (t) [phone]\par (f) [phone]\par

## 2019-10-11 NOTE — PHYSICAL EXAM
[de-identified] : On general examination the patient is adequately groomed and nourished. The vital parameters are as recorded. \par There is no lymphedema or diffuse swelling, no varicose veins, no skin warmth/erythema/scars/swelling, no ulcers and no palpable lymph nodes or masses in both lower extremities. Bilateral pedal pulses are well palpable.\par Upper Extremity:\par Both right and left upper extremities are unremarkable in terms of skin rash, lesions, pigmentation, redness, tenderness, swelling, joint instability, abnormal deformity or crepitus. The overall range of motion, sensation, motor tone and strength testing are normal.\par \par bilateral hip: Walks with normal gait. There is a well healed scar of surgery with no significant swelling, redness, or tenderness. Range of motion of the hip: active SLR of 60 degrees and hip flexion to 60 degrees Abduction 30 degrees adduction 15 degrees external rotation 30 degrees internal rotation 15 degrees with hip abductor extensor power grade +4.\par \par Knee Exam:\par The gait is right stiff knee antalgic.\par Knee alignment:      valgus bilaterally.  \par Both knees demonstrate no scars and the skin has no warmth, erythema, swelling or tenderness. \par Both knees have a range of motion of\par Extension:                    Right -5 degrees                        Left 0 degrees\par Flexion:                                   Right 115 degrees          Left 125 degrees\par Right Knee: There is posterior lateral joint line tenderness. There is mild effusion. \par Samantha's test is positive. Rosangela test is positive.\par Lachman's test, Anterior/Posterior Drawer test and Pivot Shift Tests are negative. \par There is right knee grade 1 LCL mediolateral laxity and no anteroposterior instability. \par Patella compression test is negative and patellofemoral tracking is normal with no lateral subluxation, apprehension or instability. \par Right knee quadriceps and hamstrings power is 4+.\par Left knee quadriceps and hamstrings power is 5. Left Knee exam is normal.\par \par  [de-identified] : The following radiographs were provided for review by the patient, taken recently at [facility]\par Radiographs of the right knee reveal early DJD with joint line narrowing and bone spurring. \par \par \par

## 2019-10-11 NOTE — HISTORY OF PRESENT ILLNESS
[Worsening] : worsening [9] : an average pain level of 9/10 [Intermit.] : ~He/She~ states the symptoms seem to be intermittent [de-identified] : Ms. ANIDA REYES is a 73 year old female presents s/p bilateral hip replacement 4/17 and 9/2017, with new onset worsening right knee pain. \par She localizes the pain to the posterior aspect of the right knee. She notes her pain is severe at times, with associated swelling. She notes her pain is present when walking, climbing stairs, standing from seated position. She notes she has had an xray and venous doppler of the right knee, which was negative for DVT and bakers cyst. She notes she has been told she has arthritis in the knee. She admits to clicking, grinding of the knee, denies locking, admits to occasional buckling. \par She would like to consider injection therapy. She has had cortisone injection in the past with no relief. She has had physical therapy with no relief. She has been taking NSAIDs with only temporary relief.

## 2019-10-25 ENCOUNTER — APPOINTMENT (OUTPATIENT)
Dept: ORTHOPEDIC SURGERY | Facility: CLINIC | Age: 74
End: 2019-10-25
Payer: MEDICARE

## 2019-10-25 PROCEDURE — 20610 DRAIN/INJ JOINT/BURSA W/O US: CPT | Mod: RT

## 2019-11-01 ENCOUNTER — APPOINTMENT (OUTPATIENT)
Dept: ORTHOPEDIC SURGERY | Facility: CLINIC | Age: 74
End: 2019-11-01
Payer: MEDICARE

## 2019-11-01 PROCEDURE — 20610 DRAIN/INJ JOINT/BURSA W/O US: CPT | Mod: RT

## 2019-11-01 NOTE — PROCEDURE
[Injection] : Injection [Right] : of the right [Knee Joint] : knee joint [Osteoarthritis] : Osteoarthritis [Inflammation] : Inflammation [Patient] : patient [Risk] : risk [Benefits] : benefits [Alternatives] : alternatives [Bleeding] : bleeding [Infection] : infection [Allergic Reaction] : allergic reaction [Verbal Consent Obtained] : verbal consent was obtained prior to the procedure [Alcohol] : Alcohol [Betadine] : Betadine [Ethyl Chloride Spray] : ethyl chloride spray was used as a topical anesthetic [20] : a 20-gauge [2 mL Euflexxa___(lot #)] : 2mL Euflexxa ~Ulot# [unfilled] [Bandage Applied] : a bandage [Tolerated Well] : The patient tolerated the procedure well [None] : none [de-identified] : The patient received the fsecond set of injections to Right knees of Euflexxa and tolerated well. \par The patient has been instructed to ice and elevate to alleviate/reduce swelling. \par follow up appointment recommended next week for the second set of injections to right knee. STEPHANIE

## 2019-11-01 NOTE — PROCEDURE
[Injection] : Injection [Knee Joint] : knee joint [Right] : of the right [Inflammation] : Inflammation [Osteoarthritis] : Osteoarthritis [Patient] : patient [Benefits] : benefits [Risk] : risk [Alternatives] : alternatives [Bleeding] : bleeding [Infection] : infection [Verbal Consent Obtained] : verbal consent was obtained prior to the procedure [Alcohol] : Alcohol [Allergic Reaction] : allergic reaction [Betadine] : Betadine [Ethyl Chloride Spray] : ethyl chloride spray was used as a topical anesthetic [Bandage Applied] : a bandage [20] : a 20-gauge [2 mL Euflexxa___(lot #)] : 2mL Euflexxa ~Ulot# [unfilled] [Tolerated Well] : The patient tolerated the procedure well [None] : none [de-identified] : The patient received the first set of injections to Right knees of Euflexxa and tolerated well. \par The patient has been instructed to ice and elevate to alleviate/reduce swelling. \par follow up appointment recommended next week for the second set of injections to right knee. STEPHANIE

## 2019-11-08 ENCOUNTER — APPOINTMENT (OUTPATIENT)
Dept: ORTHOPEDIC SURGERY | Facility: CLINIC | Age: 74
End: 2019-11-08
Payer: MEDICARE

## 2019-11-08 PROCEDURE — 20610 DRAIN/INJ JOINT/BURSA W/O US: CPT | Mod: RT

## 2019-11-08 NOTE — PROCEDURE
[Injection] : Injection [Right] : of the right [Knee Joint] : knee joint [Osteoarthritis] : Osteoarthritis [Inflammation] : Inflammation [Risk] : risk [Patient] : patient [Benefits] : benefits [Alternatives] : alternatives [Infection] : infection [Bleeding] : bleeding [Verbal Consent Obtained] : verbal consent was obtained prior to the procedure [Allergic Reaction] : allergic reaction [Alcohol] : Alcohol [Betadine] : Betadine [Ethyl Chloride Spray] : ethyl chloride spray was used as a topical anesthetic [Lateral] : lateral [22] : a 22-gauge [2 mL Euflexxa___(lot #)] : 2mL Euflexxa ~Ulot# [unfilled] [Bandage Applied] : a bandage [Tolerated Well] : The patient tolerated the procedure well [None] : none [de-identified] : The patient received the third set of injections to Right knee of Euflexxa and tolerated well. \par The patient has been instructed to ice and elevate to alleviate/reduce swelling. \par follow up appointment recommended next week for the second set of injections to right knee. STEPHANIE

## 2019-11-15 ENCOUNTER — APPOINTMENT (OUTPATIENT)
Dept: ORTHOPEDIC SURGERY | Facility: CLINIC | Age: 74
End: 2019-11-15

## 2019-11-15 ENCOUNTER — RX RENEWAL (OUTPATIENT)
Age: 74
End: 2019-11-15

## 2019-11-18 ENCOUNTER — RX RENEWAL (OUTPATIENT)
Age: 74
End: 2019-11-18

## 2019-11-22 ENCOUNTER — APPOINTMENT (OUTPATIENT)
Dept: ORTHOPEDIC SURGERY | Facility: CLINIC | Age: 74
End: 2019-11-22
Payer: MEDICARE

## 2019-11-22 PROCEDURE — 20610 DRAIN/INJ JOINT/BURSA W/O US: CPT | Mod: LT

## 2019-11-22 NOTE — PROCEDURE
[Injection] : Injection [Left] : of the left [Knee Joint] : knee joint [Osteoarthritis] : Osteoarthritis [Inflammation] : Inflammation [Patient] : patient [Risk] : risk [Benefits] : benefits [Alternatives] : alternatives [Bleeding] : bleeding [Infection] : infection [Allergic Reaction] : allergic reaction [Verbal Consent Obtained] : verbal consent was obtained prior to the procedure [Alcohol] : Alcohol [Betadine] : Betadine [Ethyl Chloride Spray] : ethyl chloride spray was used as a topical anesthetic [22] : a 22-gauge [2 mL Synvisc___(lot #)] : 2 mL Synvisc ~Ulot# [unfilled] [Bandage Applied] : a bandage [Tolerated Well] : The patient tolerated the procedure well [None] : none

## 2019-11-23 NOTE — PHYSICAL THERAPY INITIAL EVALUATION ADULT - CRITERIA FOR SKILLED THERAPEUTIC INTERVENTIONS
Statement Selected Statement Selected Statement Selected Statement Selected rehab potential/functional limitations in following categories/impairments found

## 2019-12-06 ENCOUNTER — APPOINTMENT (OUTPATIENT)
Dept: ORTHOPEDIC SURGERY | Facility: CLINIC | Age: 74
End: 2019-12-06
Payer: MEDICARE

## 2019-12-06 PROCEDURE — 20610 DRAIN/INJ JOINT/BURSA W/O US: CPT | Mod: LT

## 2019-12-06 NOTE — PROCEDURE
[Injection] : Injection [Osteoarthritis] : Osteoarthritis [Left] : of the left [Knee Joint] : knee joint [Patient] : patient [Risk] : risk [Inflammation] : Inflammation [Alternatives] : alternatives [Bleeding] : bleeding [Benefits] : benefits [Allergic Reaction] : allergic reaction [Infection] : infection [Verbal Consent Obtained] : verbal consent was obtained prior to the procedure [Alcohol] : Alcohol [Betadine] : Betadine [22] : a 22-gauge [Ethyl Chloride Spray] : ethyl chloride spray was used as a topical anesthetic [Bandage Applied] : a bandage [2 mL Synvisc___(lot #)] : 2 mL Synvisc ~Ulot# [unfilled] [Tolerated Well] : The patient tolerated the procedure well [None] : none

## 2019-12-13 ENCOUNTER — APPOINTMENT (OUTPATIENT)
Dept: ORTHOPEDIC SURGERY | Facility: CLINIC | Age: 74
End: 2019-12-13
Payer: MEDICARE

## 2019-12-13 PROCEDURE — 20610 DRAIN/INJ JOINT/BURSA W/O US: CPT | Mod: LT

## 2019-12-13 NOTE — PROCEDURE
[Injection] : Injection [Left] : of the left [Knee Joint] : knee joint [Osteoarthritis] : Osteoarthritis [Inflammation] : Inflammation [Patient] : patient [Risk] : risk [Benefits] : benefits [Alternatives] : alternatives [Bleeding] : bleeding [Infection] : infection [Allergic Reaction] : allergic reaction [Verbal Consent Obtained] : verbal consent was obtained prior to the procedure [Alcohol] : Alcohol [Betadine] : Betadine [Ethyl Chloride Spray] : ethyl chloride spray was used as a topical anesthetic [Lateral] : lateral [22] : a 22-gauge [2 mL Synvisc___(lot #)] : 2 mL Synvisc ~Ulot# [unfilled] [Bandage Applied] : a bandage [Tolerated Well] : The patient tolerated the procedure well [None] : none

## 2020-04-02 NOTE — OCCUPATIONAL THERAPY INITIAL EVALUATION ADULT - LEVEL OF INDEPENDENCE: SUPINE/SIT, REHAB EVAL
Subjective   Patient ID: Nely is a 19 year old female.    Chief Complaint   Patient presents with   • ER F/U   • Diabetes       HISTORY OF PRESENT ILLNESS:  Pt here to establish care. She was seen at [facility]  On 3/18/2020.  Patient presented with vaginal itching and discharge for a week.  She has type 1 diabetes and has been using insulin for several months.  At that visit her blood sugar was uncontrolled and she was diagnosed with vulvovaginitis and sent home on analog insulin 15 units 3 times daily.  Has not seen endo due to insurance change, last seen more than 4 months ago and no oral meds given and was put on humalog 8 units TID and basaglar 10 units nightly in remote past by PCP.   Pt has been checking BS TID. FBS range 300-400, during day also 300-400 and is currently using HUMALOG quick pen 15 units before each meal.   Pt states has been having some polydipsia, wt loss of 20lbs in past 3 months, polyuria, mild blurred vision, mild fatigue, no foot sores, numbness,tingling, weakness of extremities.   Patient states she was also told she had gonorrhea and chlamydia after she was seen in the emergency department.  She was treated with medication as was her partner.  She had not been sexually active again until today with a new partner, did use a condom.  She denies having any pelvic pain, vaginal discharge, vaginal bleeding, dysuria or hematuria     ACTIVE PROBLEMS:  Patient Active Problem List    Diagnosis Date Noted   • Overweight 04/02/2020     Priority: Low     Healthy BMI range 18-25     • Diabetes type 1, controlled (CMS/[facility]) 02/18/2020     Priority: Low     4/2/2020 A1c 12.7  A1c goal less than 7     • Well adult exam 02/18/2020     Priority: Low       PAST MEDICAL HISTORY:  History reviewed and updated.  PAST SURGICAL HISTORY:  History reviewed and updated.  FAMILY HISTORY:  History reviewed and updated.  SOCIAL HISTORY: History reviewed and updated.  Tobacco Use:  reports that she has never  smoked. She has never used smokeless tobacco.  Alcohol Use:  reports current alcohol use.  Drug Use:  reports current drug use. Drug: Marijuana.    ALLERGIES:  ALLERGIES: no known allergies.    MEDICATIONS:  Current Outpatient Medications   Medication   • BD PEN NEEDLE AMANDA U/F 32G X 4 MM Misc   • Blood Glucose Monitoring Suppl (ONE TOUCH ULTRA MINI) w/Device Kit   • Insulin Lispro (HUMALOG PEN SC)   • BASAGLAR KWIKPEN 100 UNIT/ML pen-injector   • ADMELOG SOLOSTAR 100 UNIT/ML pen-injector     No current facility-administered medications for this visit.      Denies OTC, herbals or supplements other than what is listed in med list    REVIEW OF SYSTEMS  Review of Systems  PER patient:   CONSTITUTIONAL: reports no fever, night sweats  EYES: reports no vision change, red eyes  ENT: reports no ear pain, nasal congestion, sore throat, or trouble swallowing  CARDIO: reports no chest pain, or palpitations.   RESP: reports no cough or shortness of breath   GASTRO: reports no abdominal pain, nausea/vomiting, constipation/diarrhea, changes in bowel habits  : see HPI  MUSCULOSKELETAL: reports no muscle aches, joint pain, swelling in the extremities  SKIN: reports no rashes  NEURO:  Reports no weakness, numbness, dizziness, or headaches  MOOD: reports no depression, anxiety, sleep disturbances, stress  ENDO/HEME: reports no temperature intolerance, bruising.   EXCEPT WHAT IS DOCUMENTED IN HPI      SCREEENINGS:   Depression Screening:    PHQ2/9:  Initial depression screening score:: 0      OBJECTIVE    /64 (BP Location: LUE - Left upper extremity, Patient Position: Sitting, Cuff Size: Regular)   Pulse (!) 101   Temp 98.2 °F (36.8 °C)   Ht 5' 3\" (1.6 m)   Wt 70.2 kg (154 lb 14 oz)   BMI 27.43 kg/m²   BSA 1.73 m²   Physical Exam  Constitutional: General Appearance: well-developed, NAD, ambulating normally, over weight   Psychiatric: good judgement; normal mood and affect, active and alert and oriented to time, place,  and person. recent memory normal and remote memory normal.  Head: Head: normocephalic and atraumatic.  Eyes: Lids and Conjunctivae: no discharge or pallor and non-injected. Sclerae: non-icteric  ENMT: no external ears lesions, no external nasal lesions, no mouth or lip lesions  Neck: supple, FROM  Musculoskeletal: Bilateral upper and lower extremities with FROM; normal tone; no lower extremity edema  Neurologic: Cranial Nerves grossly intact, no tremors: speech, gait and station intact  Skin: moist, nonicteric, no rashes  FOOT exam: inspected: Bilateral +2/4 dorsalis pulses;  no calluses, skin changes or ulcers; dryness NO, peeling skin: NO;  monofilament test performed and sensation including the dorsal aspect of feet normal    No results found for this visit on 04/02/20.    ASSESSMENT/PLAN  Problem List Items Addressed This Visit        Endocrine    Diabetes type 1, controlled (CMS/[facility]) - Primary     Diabetes is very uncontrolled with A1c today of 12.7.  I reviewed endorgan damage caused by uncontrolled diabetes importance of getting her blood sugars down with an A1c goal of less than 7 due to her age.  Advised patient that I suspect she is a type 1 diabetic and she needs to be on insulin, will refer her to endocrinology to evaluate the possibility of having type 2 diabetes but at this time will use insulins to control her sugars.  We will start her on Basaglar 15 units nightly and every 4 days increase by 4 units until reaches 30 units nightly.  Use Humalog 10 units before each meal but monitor closely for blood sugars below 100 and if those occur should decrease the Humalog.  Advised to test her blood sugars 3 times a day and send me a copy via portal for my review in 2 weeks.  Will refer her to diabetes educator endocrinology.  I did advise patient if she starts to have blood sugars consistently above 400 despite taking the insulin she needs to contact the office and if she has any syncopal episodes,           Relevant Medications    BASAGLAR KWIKPEN 100 UNIT/ML pen-injector       Other    Overweight     Patient has recently lost some weight but suspect this is due to uncontrolled diabetes.  Will need to monitor closely           Other Visit Diagnoses     Chlamydia        Was treated but had new partner so we will test today.  Safe sex practices advised    Relevant Orders    CHLAMYDIA/GC BY NUCLEIC ACID AMPLIFICATION          Plan       Return in about 6 weeks (around 5/14/2020) for f/u on chronic condition. Send BS log via portal for my review in 2 weeks          Electronically signed by: Maria De Jesus Ferrer MD  4/2/2020  .         NT; Pt. received OOB, sitting in chair

## 2020-05-22 ENCOUNTER — APPOINTMENT (OUTPATIENT)
Dept: ORTHOPEDIC SURGERY | Facility: CLINIC | Age: 75
End: 2020-05-22
Payer: MEDICARE

## 2020-05-22 DIAGNOSIS — Z96.642 PRESENCE OF LEFT ARTIFICIAL HIP JOINT: ICD-10-CM

## 2020-05-22 PROCEDURE — 73562 X-RAY EXAM OF KNEE 3: CPT | Mod: 50

## 2020-05-22 PROCEDURE — 99214 OFFICE O/P EST MOD 30 MIN: CPT

## 2020-05-22 NOTE — PHYSICAL EXAM
[Antalgic] : antalgic [LE] : Sensory: Intact in bilateral lower extremities [Knee] : patellar 2+ and symmetric bilaterally [Ankle] : ankle 2+ and symmetric bilaterally [DP] : dorsalis pedis 2+ and symmetric bilaterally [PT] : posterior tibial 2+ and symmetric bilaterally [de-identified] : On general examination the patient is adequately groomed and nourished. The vital parameters are as recorded. \par There is no lymphedema or diffuse swelling, no varicose veins, no skin warmth/erythema/scars/swelling, no ulcers and no palpable lymph nodes or masses in both lower extremities. Bilateral pedal pulses are well palpable.\par Upper Extremity:\par Both right and left upper extremities are unremarkable in terms of skin rash, lesions, pigmentation, redness, tenderness, swelling, joint instability, abnormal deformity or crepitus. The overall range of motion, sensation, motor tone and strength testing are normal.\par \par bilateral hip: Walks with normal gait. There is a well healed scar of surgery with no significant swelling, redness, or tenderness. Range of motion of the hip: active SLR of 60 degrees and hip flexion to 60 degrees Abduction 30 degrees adduction 15 degrees external rotation 30 degrees internal rotation 15 degrees with hip abductor extensor power grade +4.\par \par Knee Exam:\par The gait is right stiff knee antalgic.\par Knee alignment:      valgus bilaterally.  \par Both knees demonstrate no scars and the skin has no warmth, erythema, swelling or tenderness. \par Both knees have a range of motion of\par Extension:                    Right -5 degrees                        Left 0 degrees\par Flexion:                                   Right 115 degrees          Left 115 degrees\par Right Knee: There is posterior lateral joint line tenderness. There is mild effusion. \par Samantha's test is positive. Rosangela test is positive.\par Lachman's test, Anterior/Posterior Drawer test and Pivot Shift Tests are negative. \par There is right knee grade 1 LCL mediolateral laxity and no anteroposterior instability. \par Patella compression test is negative and patellofemoral tracking is normal with no lateral subluxation, apprehension or instability. \par Right knee quadriceps and hamstrings power is 4+.\par Left knee quadriceps and hamstrings power is 5. Left Knee exam is normal.\par \par  [de-identified] : The following radiographs were ordered and read by me during this patients visit. I reviewed each radiograph in detail with the patient and discussed the findings as highlighted below. \par AP, lateral and skyline views of the bilateral knees confirm advanced degenerative joint disease with medial joint space narrowing and osteophyte formation\par previous AP and false Profile views of the bilateral hips reveal stable well fixed and aligned total hip replacements with no signs of mechanical failure or periprosthetic fracture.

## 2020-05-22 NOTE — CONSULT LETTER
[Dear  ___] : Dear  [unfilled], [Consult Letter:] : I had the pleasure of evaluating your patient, [unfilled]. [Consult Closing:] : Thank you very much for allowing me to participate in the care of this patient.  If you have any questions, please do not hesitate to contact me. [Sincerely,] : Sincerely, [FreeTextEntry2] : PATRICE WELCH  [FreeTextEntry1] : Stable Bilateral THRs, Bilateral Knee advanced degenerative joint disease \par The natural history and treatment of degenerative arthritis was discussed with the patient at length today. The spectrum of treatment including nonoperative modalities to surgical intervention was elucidated. Noninvasive and nonoperative treatment modalities include weight reduction, activity modification with low impact exercise,  as needed use of acetaminophen or anti-inflammatory medications if tolerated, glucosamine/chondroitin supplements, and physical therapy. Further treatments can include corticosteroid injection and the use of viscosupplementation with hyaluronic acid injections. Definitive surgical treatment can certainly include total joint arthroplasty also. The risks and benefits of each treatment options was discussed and all questions were answered.\par The patient was informed of the findings and recommended conservative management in the form of a home exercise program, activity modifications, stationary bicycling, swimming and weight loss program. A trial of Glucosamine and Chondroiten Sulphate was recommended.\par A prescription for a course of physical therapy was provided.\par Patient cannot tolerate NSAIDs, she will continue to take Tylenol arthritis as needed for pain. \par I have recommended Euflexxa injections to the bilateral knee and the patient agreed to proceed, and the risks, benefits and side effects were discussed.\par Follow-up appointment was recommended once the injection is received in the office to begin the series \par   [FreeTextEntry3] : Emiliano Mckenna MD\par \par ______________________________________________\par [facility]: Hip/Knee Arthroplasty\par [address], [facility] [address] [facility] [address], [place] [address]\par (t) [phone]\par (f) [phone]

## 2020-05-22 NOTE — HISTORY OF PRESENT ILLNESS
[8] : an average pain level of 8/10 [de-identified] : Ms. NAIDA REYES is a 74 year old female, status post left total hip replacement 9/11/17, status post right total hip replacement 4/10/17, both doing well, presenting with bilateral knee pain. She localizes the pain to the medial and anterior aspect of the bilateral knee. The patient describes the pain as sharp, and states it is intermittent, based on activity. She states the pain is exacerbated by walking long distance, climbing/descending stairs, and rising from a seated position. She has an issue with bleeding so cannot tolerate NSAIDs, she takes Tylenol arthritis as needed. Patient had acute pain last week of the left knee and was put on a short course of steroids buy her PCP which helped reduce her pain/swelling temporarily. She admits to prior conservative management inclusive of physical therapy with only mild improvement in condition. Patient had a Euflexxa injection to the right knee in November 2019, and Synvisc Injections in December of 2019 to the left knee. She denies hip or lower back pain. The patient admits to limitations in their quality of life, and is present to discuss options for treatment, hoping to repeat injections to the knees as she is not ready to discuss TKRs at this time given the COVID19 pandemic. JWILFRID.

## 2020-05-22 NOTE — DISCUSSION/SUMMARY
[de-identified] : Stable Bilateral THRs, Bilateral Knee advanced degenerative joint disease \par The natural history and treatment of degenerative arthritis was discussed with the patient at length today. The spectrum of treatment including nonoperative modalities to surgical intervention was elucidated. Noninvasive and nonoperative treatment modalities include weight reduction, activity modification with low impact exercise,  as needed use of acetaminophen or anti-inflammatory medications if tolerated, glucosamine/chondroitin supplements, and physical therapy. Further treatments can include corticosteroid injection and the use of viscosupplementation with hyaluronic acid injections. Definitive surgical treatment can certainly include total joint arthroplasty also. The risks and benefits of each treatment options was discussed and all questions were answered.\par The patient was informed of the findings and recommended conservative management in the form of a home exercise program, activity modifications, stationary bicycling, swimming and weight loss program. A trial of Glucosamine and Chondroiten Sulphate was recommended.\par A prescription for a course of physical therapy was provided.\par Patient cannot tolerate NSAIDs, she will continue to take Tylenol arthritis as needed for pain. \par I have recommended Euflexxa injections to the bilateral knee and the patient agreed to proceed, and the risks, benefits and side effects were discussed.\par Follow-up appointment was recommended once the injection is received in the office to begin the series \par

## 2020-05-25 ENCOUNTER — FORM ENCOUNTER (OUTPATIENT)
Age: 75
End: 2020-05-25

## 2020-06-05 ENCOUNTER — APPOINTMENT (OUTPATIENT)
Dept: ORTHOPEDIC SURGERY | Facility: CLINIC | Age: 75
End: 2020-06-05
Payer: MEDICARE

## 2020-06-05 PROCEDURE — 20610 DRAIN/INJ JOINT/BURSA W/O US: CPT | Mod: 50

## 2020-06-05 NOTE — PROCEDURE
[Injection] : Injection [Bilateral] : of bilateral [Knee Joint] : knee joint [Osteoarthritis] : Osteoarthritis [Patient] : patient [Risk] : risk [Benefits] : benefits [Alternatives] : alternatives [Bleeding] : bleeding [Infection] : infection [Allergic Reaction] : allergic reaction [Verbal Consent Obtained] : verbal consent was obtained prior to the procedure [Betadine] : Betadine [Ethyl Chloride Spray] : ethyl chloride spray was used as a topical anesthetic [Lateral] : lateral [Anterior] : anterior [22] : a 22-gauge [Bandage Applied] : a bandage [Tolerated Well] : The patient tolerated the procedure well [None] : none [de-identified] : The patient received Gel-one injections to bilateral knees today and tolerated the procedure well. \par The patient has been instructed to ice and elevate to alleviate/reduce swelling. \par She has been instructed to follow up in three to six months. \par Lot # [id number] \par Exp 9/8/2020 [FreeTextEntry8] : Gel one injection bilateral knees.

## 2021-03-18 NOTE — DISCHARGE NOTE ADULT - NS AS DC FOLLOWUP INST YN
Yes Double O-Z Flap Text: The defect edges were debeveled with a #15 scalpel blade.  Given the location of the defect, shape of the defect and the proximity to free margins a Double O-Z flap was deemed most appropriate.  Using a sterile surgical marker, an appropriate transposition flap was drawn incorporating the defect and placing the expected incisions within the relaxed skin tension lines where possible. The area thus outlined was incised deep to adipose tissue with a #15 scalpel blade.  The skin margins were undermined to an appropriate distance in all directions utilizing iris scissors.

## 2021-05-21 ENCOUNTER — APPOINTMENT (OUTPATIENT)
Dept: ORTHOPEDIC SURGERY | Facility: CLINIC | Age: 76
End: 2021-05-21
Payer: MEDICARE

## 2021-05-21 VITALS
WEIGHT: 160 LBS | SYSTOLIC BLOOD PRESSURE: 145 MMHG | HEIGHT: 64 IN | DIASTOLIC BLOOD PRESSURE: 80 MMHG | HEART RATE: 68 BPM | BODY MASS INDEX: 27.31 KG/M2

## 2021-05-21 DIAGNOSIS — Z96.641 PRESENCE OF RIGHT ARTIFICIAL HIP JOINT: ICD-10-CM

## 2021-05-21 PROCEDURE — 99072 ADDL SUPL MATRL&STAF TM PHE: CPT

## 2021-05-21 PROCEDURE — 99214 OFFICE O/P EST MOD 30 MIN: CPT

## 2021-05-24 PROBLEM — Z96.641 STATUS POST TOTAL REPLACEMENT OF RIGHT HIP: Status: ACTIVE | Noted: 2017-04-19

## 2021-05-26 ENCOUNTER — FORM ENCOUNTER (OUTPATIENT)
Age: 76
End: 2021-05-26

## 2021-06-04 ENCOUNTER — APPOINTMENT (OUTPATIENT)
Dept: ORTHOPEDIC SURGERY | Facility: CLINIC | Age: 76
End: 2021-06-04
Payer: MEDICARE

## 2021-06-04 VITALS — WEIGHT: 160 LBS | BODY MASS INDEX: 27.31 KG/M2 | HEIGHT: 64 IN

## 2021-06-04 PROCEDURE — 99214 OFFICE O/P EST MOD 30 MIN: CPT | Mod: 25

## 2021-06-04 PROCEDURE — 99072 ADDL SUPL MATRL&STAF TM PHE: CPT

## 2021-06-04 PROCEDURE — 20610 DRAIN/INJ JOINT/BURSA W/O US: CPT | Mod: 50

## 2021-07-15 NOTE — PHYSICAL THERAPY INITIAL EVALUATION ADULT - PHYSICAL ASSIST/NONPHYSICAL ASSIST: GAIT, REHAB EVAL
Cardiology PROGRESS NOTE       HISTORY     Trisha Zapata is a 48 year old female who has history of cardiomyopathy and factor V Leiden deficiency who presents for follow up. Since her last visit on 2020, the patient states that she has been feeling good. The patient denies any chest pains, palpitations, or shortness of breath. The patient denies any lightheadedness or dizziness. There is no orthopnea or PND. No other complaints at this time.    She reports that her home life can be stressful at times due to her son. He is 9 years old and has some mental health issues. They adopted him at 2 months old.       Echocardiogram 2018:  Mild tricuspid valve regurgitation.  Normal left ventricular size, systolic function and wall thickness, with no regional wall motion abnormalities.  EF 60%.    Echocardiogram 2013:  Normal left ventricular size and systolic function.  No significant valve abnormalities.  EF 58%.        MEDICAL HISTORY     Past Medical History:   Diagnosis Date   • Back pain 2014   • Cardiomyopathy (CMS/HCC) 2013   • DVT (deep venous thrombosis) (CMS/HCC) 2011    Right Leg   • Factor 5 Leiden mutation, heterozygous (CMS/HCC) 2014   • Hodgkin's disease, nodular sclerosis, unspecified site, extranodal and solid organ sites 2002    right axillary lymph node   • Hypertension 2013   • Melanoma of skin, site unspecified 2006    neg sentinel nodes, depth 0.26 mm   • Phlebitis and thrombophlebitis of other deep vessels of lower extremities 1999    left leg   • Sleep apnea     instructed to bring machine         SURGICAL HISTORY     Past Surgical History:   Procedure Laterality Date   • Biopsy/removal, lymph node(s)  2002   •  section, low transverse  2004   • Cystourethroscopy /lithotripsy Right     Dr. Maza   • Egd  2020   • Exc skin malig 1.1-2cm remaindr body  2006    malignant melanoma Rachid level II, depth 0.26 mm, no  ulceration, LN neg   • Hysteroscopy endometrial ablation  06/24/2011   • Remove tonsils/adenoids,<11 y/o      T & A   • Tubal ligation     • Wound debridement  07/07/2006    excisional         MEDICATIONS     Current Outpatient Medications   Medication Sig   • lisinopril (ZESTRIL) 2.5 MG tablet Take 1 tablet by mouth daily.   • rivaroxaban (Xarelto) 20 MG Tab Take 1 tablet by mouth daily (with dinner). Labs needed for further refills   • carvedilol (COREG) 6.25 MG tablet Take 1 tablet by mouth 2 times daily.   • cyclobenzaprine (FLEXERIL) 10 MG tablet Take 1 tablet by mouth 3 times daily as needed for Muscle spasms.   • citalopram (CeleXA) 10 MG tablet Take 1 tablet by mouth daily.   • clindamycin (Cleocin-T) 1 % lotion Apply to entire face in the AM   • sulfacetamide-sulfur 10-5 % topical emulsion cleanser Wash affected areas of face with rosacea twice daily, leave on for 5-10 min then rinse   • metroNIDAZOLE 0.75 % lotion Please apply to entire face   PM   • albuterol 108 (90 Base) MCG/ACT inhaler Inhale 2 puffs into the lungs every 4 hours as needed for Shortness of Breath or Wheezing.     No current facility-administered medications for this visit.         ALLERGIES   ALLERGIES:  No Known Allergies      PHYSICAL EXAM     Vital Signs:    Vitals:    07/15/21 1045   BP: 104/70   Pulse: 68   Resp: 16   Weight: 119.3 kg   Height: 5' 2\" (1.575 m)   LMP: 12/15/2020   General:  NAD, pleasant, alert and oriented x3.  Neck:  No carotid bruits.  No JVD (jugular venous distension).  Normal carotid upstroke.  Pulmonary:  No retractions or increased work of breathing.  Clear to auscultation bilaterally.  No crackles or wheezing.  Cardiovascular:  PMI (point of maximal impulse) in 5th intercostal place.  RRR.  Normal S1 and S2.  No S3 or S4 appreciated.  There are no murmurs.  Abdomen:  Bowel sounds normoactive.  Soft, nontender, nondistended.  No hepatosplenomegaly.  Extremities:  No edema or cyanosis.      Glucose (mg/dL)    Date Value   05/25/2021 93      Cholesterol (mg/dL)   Date Value   05/25/2021 212 (H)     HDL (mg/dL)   Date Value   05/25/2021 49 (L)     LDL (mg/dL)   Date Value   05/25/2021 124     Triglycerides (mg/dL)   Date Value   05/25/2021 193 (H)      Lab Results   Component Value Date    GPT 19 05/25/2021         ASSESSMENT     1.  Cardiomyopathy, Echo EF 60% 6/2018.   2.  Factor V Leiden deficiency.  3.  Hypertension  4.  Sleep Apnea    PLAN     Trisha Zapata is a pleasant 48 year old white woman with history of a cardiomyopathy who  isn't having any complaints of chest pain or shortness of breath.  The patient is well compensated. Her blood pressure is stable. Her lungs are clear on exam. She has no lower extremity edema. Echocardiogram 06/2018 revealed normal heart function, EF 60%. Lipids on 05/25/2021 were elevated with LDL of 124. The patient is on chronic anticoagulation with Xarelto because of her factor V Leiden deficiency. The patient is stable from a cardiac standpoint. She is stable on her current regimen. She continues the current medications. She will follow up with me in 6 months. If she has any problems before then, she will contact me sooner. Thank you for allowing me to participate in the care of this patient.    On 7/15/2021, I Soon Juan Antonio Werner, personally transcribed this document on behalf of  Hans Garcia MD.    The documentation recorded by the scribe accurately and completely reflects the service(s) I personally performed and the decisions made by me.             Sincerely,     Hans Garcia M.D.-Ph.D. [facility]           1 person + 1 person to manage equipment

## 2022-04-21 ENCOUNTER — APPOINTMENT (OUTPATIENT)
Dept: ORTHOPEDIC SURGERY | Facility: CLINIC | Age: 77
End: 2022-04-21

## 2022-05-27 NOTE — DISCUSSION/SUMMARY
Kya Hillman is a 52 y.o.  female being evaluated by a Virtual Visit (video visit) encounter to address concerns as mentioned below. Due to this being a TeleHealth encounter (During WKRRP-95 public health emergency), evaluation of the following organ systems was limited:  Vitals/Constitutional/EENT/Resp/CV/GI//MS/Neuro/Skin/Heme-Lymph-Imm. Pursuant to the emergency declaration under the [address] [place] [address], [address] [place] [address] and the [employer] Act, this Virtual Visit was conducted with patient's (and/or legal guardian's) consent, to reduce the risk of exposure to COVID-19 and provide necessary medical care. CHIEF COMPLAINT: f/u evaluation for uncontrolled type 2 diabetes    HISTORY OF PRESENT ILLNESS:   Kya Hillman is a 52 y.o. female with a PMHx as noted below who presents to the endocrinology clinic for f/u evaluation of uncontrolled type 2 diabetes. Diagnosed in 2016    Patient reports that her sugar control has been excellent on current regimen with fasting and postprandial number ranging from . She states she feels a lot better and that she has also learnt a lot from Diabetes education classes and she is working her way to get through the entire diabetes education book. She is very compliant with her meds and lifestyle changes she is implementing.       Currently taking the following meds:  -Metformin 1000mg twice daily  -Glipizide 10mg twice daily  -Trulicity 8.96AO weekly injection, she will be starting the 1.5mg dose in few days  -Levemir insulin 34 units in the morning and 32 units in the evening  -Humalog 15 units before Breakfast, Lunch, Dinner without requiring SS for past 3 weeks      Review of most recent diabetes-related labs:  Lab Results   Component Value Date    HBA1C 11.7 (H) 04/08/2022    HBA1C 9.3 (H) 10/20/2021    HBA1C 8.3 (H) 03/11/2019    LVL1ULYP 8.5 04/27/2021    ARH1VSQV 6.3 2021    BIE2TALY 9.4 10/19/2020    CHOL 136 2022    LDLC 84 2022    GFRAA >60 2022    GFRNA >60 2022    CREATEXT 0.99 2022    MCACR 3 2022    TSH 1.290 2019    B12LT 423 2016     Lab Key:  [id number] = IA-2 pancreatic islet cell autoantibody  CPEPL = C-peptide level  :EXT = External Lab  GADLT = CAILIN-65 autoantibody   INSUL = Insulin level  MCACR (or MALBEXT) = Urine Microalbumin (or External UM)  B12LT = B12 level    PAST MEDICAL/SURGICAL HISTORY:   Past Medical History:   Diagnosis Date    Anemia (iron deficiency)     Anxiety     per pt on 2021    Asthma     DDD (degenerative disc disease), lumbar     Depression     DM type 2 (diabetes mellitus, type 2) ([address] 2011    GERD (gastroesophageal reflux disease)     GI bleed     Hepatic steatosis 2016    confirmed by US    HTN (hypertension)     Irregular menses     Irritable bowel     Kidney mass     19: US showed rigth renal cystic nephroma, follows with Urology (Dr. Kan Carrion)    Long-term use of immunosuppressant medication       per pt on 2021    Morbid obesity ([address]     FE on CPAP     cpap complaint  per pt on 2021    PUD (peptic ulcer disease) 2015    Renal cancer, right ([address]     tx surgery    Rheumatoid arthritis ([address]     per pt on 2021     Past Surgical History:   Procedure Laterality Date    COLONOSCOPY N/A 2021    COLONOSCOPY performed by Kentrell Rodriguez MD at [facility] END[facility]COPY. Sigmoid diverticulosis, int/ext hemorrhoids.  Repeat in 5 yrs due to fam hx of colon ca    COLONOSCOPY,DIAGNOSTIC  2021         HX  SECTION      x 2    HX COLONOSCOPY  2015    HX DILATION AND CURETTAGE  2021    HX ENDOSCOPY  2015    HX HYSTERECTOMY  2021    HX HYSTEROSCOPY WITH ENDOMETRIAL ABLATION  2014    HX TUBAL LIGATION      HX TUMOR REMOVAL  2016    right kidney, Dr Lindsey Murphy removed from right kidney on 02/15/2016        ALLERGIES:   Allergies   Allergen Reactions    Lisinopril Cough    Pcn [Penicillins] Hives       MEDICATIONS ON ADMISSION:     Current Outpatient Medications:     pregabalin (LYRICA) 200 mg capsule, TAKE 1 TABLET BY MOUTH TWICE DAILY . DO NOT EXCEED 2 PER 24 HOURS, Disp: 60 Capsule, Rfl: 0    Insulin Needles, Disposable, 32 gauge x 5/32\" ndle, Please use with your insulin pens as directed, Disp: 100 Pen Needle, Rfl: 5    Levemir FlexTouch U-100 Insuln 100 unit/mL (3 mL) inpn, 33 Units by SubCUTAneous route two (2) times a day. Dx: E11.42   Please take 34 Units in the morning and 32 Units in the evening  Indications: type 2 diabetes mellitus, Disp: 100 mL, Rfl: 3    HumaLOG KwikPen Insulin 100 unit/mL kwikpen, Inject 15 units under the skin before breakfast, lunch and dinner. Use sliding scale insulin also, Disp: 20 Adjustable Dose Pre-filled Pen Syringe, Rfl: 5    dulaglutide (Trulicity) 6.25 LV/7.2 mL sub-q pen, 0.5 mL by SubCUTAneous route every seven (7) days. Take 0.75mg weekly for 4 weeks then increase to 1.5mg weekly after that, Disp: 4 Pen, Rfl: 0    cyclobenzaprine (FLEXERIL) 5 mg tablet, Take 1 Tablet by mouth three (3) times daily as needed for Muscle Spasm(s). , Disp: 21 Tablet, Rfl: 0    naproxen (NAPROSYN) 500 mg tablet, Take 1 Tablet by mouth every twelve (12) hours as needed for Pain., Disp: 20 Tablet, Rfl: 0    albuterol (PROVENTIL HFA, VENTOLIN HFA, PROAIR HFA) 90 mcg/actuation inhaler, Take 2 Puffs by inhalation every six (6) hours as needed for Wheezing. Asthma, Dx: J45.40, Disp: 54 g, Rfl: 3    fluticasone propion-salmeteroL (Advair Diskus) 500-50 mcg/dose diskus inhaler, Take 1 Puff by inhalation every twelve (12) hours.  Asthma, Dx: J45.30, Disp: 180 Each, Rfl: 3    amitriptyline (ELAVIL) 50 mg tablet, TAKE 1 TABLET BY MOUTH NIGHTLY FOR  NUMBNESS AT  HANDS  AND  FEET, Disp: 30 Tablet, Rfl: 0    HYDROcodone-acetaminophen (NORCO) 5-325 mg per tablet, TAKE 1 TO 2 TABLETS BY MOUTH 4 TIMES DAILY AS NEEDED, Disp: , Rfl:     buPROPion SR (WELLBUTRIN SR) 150 mg SR tablet, Take 1 Tablet by mouth two (2) times a day., Disp: 180 Tablet, Rfl: 3    ondansetron (Zofran ODT) 4 mg disintegrating tablet, Take 1 Tablet by mouth every eight (8) hours as needed for Nausea for up to 360 days. , Disp: 60 Tablet, Rfl: 3    dicyclomine (BENTYL) 10 mg/5 mL soln oral solution, Take 10 mL by mouth four (4) times daily as needed (abd pain or diarrhea). , Disp: 473 mL, Rfl: 5    metFORMIN ER (GLUCOPHAGE XR) 500 mg tablet, Take 2 Tablets by mouth two (2) times a day., Disp: 360 Tablet, Rfl: 3    montelukast (SINGULAIR) 10 mg tablet, TAKE 1 TABLET BY MOUTH ONCE DAILY FOR ALLERGIES AND FOR ASTHMA, Disp: 90 Tablet, Rfl: 3    albuterol (PROVENTIL VENTOLIN) 2.5 mg /3 mL (0.083 %) nebu, USE ONE VIAL IN NEBULIZER EVERY 4 HOURS AS NEEDED FOR WHEEZING. Dx: J45.40, Disp: 90 Each, Rfl: 3    valsartan (DIOVAN) 80 mg tablet, Take 1 Tablet by mouth daily. Indications: high blood pressure, Disp: 90 Tablet, Rfl: 3    glipiZIDE (GLUCOTROL) 10 mg tablet, Take 1 tablet by mouth twice daily, Disp: 180 Tablet, Rfl: 3    predniSONE (DELTASONE) 20 mg tablet, Take 2 tablets by mouth once daily for 5 days. , Disp: 10 Tablet, Rfl: 0    atorvastatin (LIPITOR) 20 mg tablet, Take 1 Tablet by mouth daily. Indications: high cholesterol, Disp: 90 Tablet, Rfl: 1    ibuprofen (MOTRIN) 800 mg tablet, Take one tablet every 8 hours for three days, and then every 8 hours as needed for pain thereafter, Disp: 30 Tablet, Rfl: 1    lidocaine (LIDODERM) 5 %, Apply patch to the affected area for 12 hours a day and remove for 12 hours a day., Disp: 15 Each, Rfl: 0    acetaminophen (TYLENOL) 500 mg tablet, Take 2 Tabs by mouth every six (6) hours as needed for Pain., Disp: 20 Tab, Rfl: 0    folic acid (FOLVITE) 1 mg tablet, Take 1 mg by mouth daily. , Disp: , Rfl:     methotrexate (RHEUMATREX) 2.5 mg tablet, every Wednesday.  Pt takes 9 pills for 20 mg total., Disp: , Rfl:     meclizine (ANTIVERT) 25 mg tablet, Take 1 Tab by mouth three (3) times daily as needed for Dizziness. , Disp: 20 Tab, Rfl: 1    nitroglycerin (NITROSTAT) 0.4 mg SL tablet, Take 1 Tab by mouth every five (5) minutes as needed for Chest Pain. Sit/Lay down then put one tab under the tongue every 5 minutes as needed for chest pain for 3 doses, Disp: 1 Bottle, Rfl: 0    SOCIAL HISTORY:   Social History     Socioeconomic History    Marital status:      Spouse name: Not on file    Number of children: Not on file    Years of education: Not on file    Highest education level: Not on file   Occupational History    Not on file   Tobacco Use    Smoking status: Never Smoker    Smokeless tobacco: Never Used   Vaping Use    Vaping Use: Never used   Substance and Sexual Activity    Alcohol use: Yes     Alcohol/week: 1.0 standard drink     Types: 1 Glasses of wine per week     Comment: wine coolers per month 1-2 per pt on 7/29/2021    Drug use: No    Sexual activity: Yes     Partners: Male   Other Topics Concern    Not on file   Social History Narrative    Not on file     Social Determinants of Health     Financial Resource Strain:     Difficulty of Paying Living Expenses: Not on file   Food Insecurity:     Worried About Running Out of Food in the Last Year: Not on file    Oscar of Food in the Last Year: Not on file   Transportation Needs:     Lack of Transportation (Medical): Not on file    Lack of Transportation (Non-Medical):  Not on file   Physical Activity:     Days of Exercise per Week: Not on file    Minutes of Exercise per Session: Not on file   Stress:     Feeling of Stress : Not on file   Social Connections:     Frequency of Communication with Friends and Family: Not on file    Frequency of Social Gatherings with Friends and Family: Not on file    Attends Mosque Services: Not on file    Active Member of Clubs or Organizations: Not on file  Attends Club or Organization Meetings: Not on file    Marital Status: Not on file   Intimate Partner Violence:     Fear of Current or Ex-Partner: Not on file    Emotionally Abused: Not on file    Physically Abused: Not on file    Sexually Abused: Not on file   Housing Stability:     Unable to Pay for Housing in the Last Year: Not on file    Number of [place] in the Last Year: Not on file    Unstable Housing in the Last Year: Not on file       FAMILY HISTORY:  Family History   Problem Relation Age of Onset    Cancer Mother         Ovarian Cancer /breast ca    Heart Attack Father     Heart Disease Father     Diabetes Father     Other Father         pancreatitis    Cancer Sister     Other Sister         chrons    Heart Attack Maternal Grandfather     Diabetes Paternal Grandmother     HIV/AIDS Son        REVIEW OF SYSTEMS: Complete ROS assessed and noted for that which is described above, all else are negative. Eyes: normal  ENT: normal  CVS: normal  Resp: normal  GI: normal  : normal  GYN: normal  Endocrine: normal  Integument: normal  Musculoskeletal: normal  Neuro: normal  Psych: normal      PHYSICAL EXAMINATION: Telehealth appointment    VITAL SIGNS:  There were no vitals taken for this visit. There were no vitals filed for this visit. REVIEW OF LABORATORY AND RADIOLOGY DATA:   Labs and documentation have been reviewed as described above. ASSESSMENT AND PLAN:   Jeramy Klein is a 52 y.o. female with a PMHx as noted above who presents to the endocrinology clinic for f/u evaluation of type 2 diabetes.      DM2, better controlled   HTN  HLD      DM2:  Patient with much improved blood sugar numbers   Plan to Continue MTF 1g BID and Glipizide 10mg BID  Levemir from 30 BID to 34 in AM and 32 in PM  Humalog 15 units before meals  Trulicity 0.6YM weekly but was advised to cut back on prandial insulin if she is starting to have low sugars during the day, plan is to cut back on meal time insulin as tolerated and have her on oral meds and weekly injection, option of going up on Tulicity dose further to 3mg and 4.5mg discussed and patient is in agreement with plan, will decide to do that in the coming visit        Continue to check glucose 4x/day  To keep her Ophthalm and Podiatry appts  RTC in 2months for repeat a1c       HTN: BP stable continue current meds  HLD: stable, slightly above goal, will obtain lipid panel repeats in 6 months after lifestyle modif implemented  Obesity: GLP-1 dose increase, will also go up as tolerated which will help with weight loss, will discuss bariatric surgery in the next visit    Labs: up to date    We discussed the expected course, resolution and complications of the diagnosis(es) in detail. Medication risks, benefits, costs, interactions, and alternatives were discussed as indicated. I advised Jonas Dejesus to contact the office if her condition worsens, changes or fails to improve as anticipated. Patient expressed understanding with the diagnosis(es) and plan. MD [facility]    Please see patient instructions. [de-identified] : s/p bilateral total hip replacements, stable, with right knee moderate DJD with recent flair up. \par The natural history and treatment of degenerative arthritis was discussed with the patient at length today. The spectrum of treatment including nonoperative modalities to surgical intervention was elucidated. Noninvasive and nonoperative treatment modalities include weight reduction, activity modification with low impact exercise,  as needed use of acetaminophen or anti-inflammatory medications if tolerated, glucosamine/chondroitin supplements, and physical therapy. Further treatments can include corticosteroid injection and the use of viscosupplementation with hyaluronic acid injections. Definitive surgical treatment can certainly include total joint arthroplasty also. The risks and benefits of each treatment options was discussed and all questions were answered.\par The patient was informed of the findings and recommended conservative management in the form of a home exercise program, activity modifications, stationary bicycling, swimming and weight loss program. A trial of Glucosamine and Chondroiten Sulphate was recommended.\par I have recommended Euflexxa injections to the right knee and the patient agreed to proceed, and the risks, benefits and side effects were discussed.Follow-up appointment was recommended once the injection is received in the office to begin the series \par

## 2022-09-06 NOTE — OCCUPATIONAL THERAPY INITIAL EVALUATION ADULT - DIAGNOSIS, OT EVAL
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 [Menarche Age: ____] : age at menarche was [unfilled] [Y] : Patient is sexually active s/p Left Total Hip Replacement; Decreased functional mobility; Decreased ADL management [Currently Active] : currently active [Men] : men [Mammogramdate] : 4/22/21 [TextBox_19] : BR2 [BreastSonogramDate] : 11/07/08 [PapSmeardate] : 5/3/22 [TextBox_31] : NEG [BoneDensityDate] : 4/4/19 [TextBox_37] : OSTEOPENIA [ColonoscopyDate] : 6/24/14 [HPVDate] : 5/3/22 [TextBox_78] : NEG [LMPDate] : 2015 [PGHxTotal] : 3 [[facility] Campusx[facility] Nurs[facility] for Blind BabieslTerm] : 3 [[facility] Medical Centeriving] : 3 [FreeTextEntry1] : 2015

## 2022-10-31 ENCOUNTER — APPOINTMENT (OUTPATIENT)
Dept: ORTHOPEDIC SURGERY | Facility: CLINIC | Age: 77
End: 2022-10-31

## 2022-10-31 VITALS — BODY MASS INDEX: 28.17 KG/M2 | HEIGHT: 64 IN | WEIGHT: 165 LBS

## 2022-10-31 DIAGNOSIS — Z86.69 PERSONAL HISTORY OF OTHER DISEASES OF THE NERVOUS SYSTEM AND SENSE ORGANS: ICD-10-CM

## 2022-10-31 PROCEDURE — 73564 X-RAY EXAM KNEE 4 OR MORE: CPT | Mod: 50

## 2022-10-31 PROCEDURE — 99214 OFFICE O/P EST MOD 30 MIN: CPT

## 2022-10-31 RX ORDER — TRAMADOL HYDROCHLORIDE 25 MG/1
TABLET, COATED ORAL
Refills: 0 | Status: COMPLETED | COMMUNITY
End: 2022-10-31

## 2022-10-31 RX ORDER — HYALURONATE SODIUM, STABILIZED 88 MG/4 ML
88 SYRINGE (ML) INTRAARTICULAR
Qty: 1 | Refills: 0 | Status: COMPLETED | COMMUNITY
Start: 2021-05-21 | End: 2022-10-31

## 2022-10-31 RX ORDER — TAFLUPROST 0.01 MG/ML
0 SOLUTION/ DROPS OPHTHALMIC
Qty: 90 | Refills: 0 | Status: ACTIVE | COMMUNITY
Start: 2022-08-04

## 2022-10-31 RX ORDER — CIPROFLOXACIN HYDROCHLORIDE 500 MG/1
500 TABLET, FILM COATED ORAL
Qty: 10 | Refills: 0 | Status: COMPLETED | COMMUNITY
Start: 2017-04-04 | End: 2022-10-31

## 2022-10-31 RX ORDER — TRAMADOL HYDROCHLORIDE 50 MG/1
50 TABLET, COATED ORAL
Qty: 90 | Refills: 0 | Status: COMPLETED | COMMUNITY
Start: 2017-04-19 | End: 2022-10-31

## 2022-10-31 RX ORDER — HYALURONATE SODIUM 20 MG/2 ML
20 SYRINGE (ML) INTRAARTICULAR
Qty: 2 | Refills: 0 | Status: COMPLETED | COMMUNITY
Start: 2020-05-22 | End: 2022-10-31

## 2022-10-31 RX ORDER — CYCLOBENZAPRINE HYDROCHLORIDE 5 MG/1
5 TABLET, FILM COATED ORAL 3 TIMES DAILY
Qty: 60 | Refills: 0 | Status: COMPLETED | COMMUNITY
Start: 2018-08-24 | End: 2022-10-31

## 2022-10-31 RX ORDER — TRAMADOL HYDROCHLORIDE 50 MG/1
50 TABLET, COATED ORAL
Qty: 90 | Refills: 0 | Status: COMPLETED | COMMUNITY
Start: 2017-04-28 | End: 2022-10-31

## 2022-10-31 RX ORDER — ACETAMINOPHEN 325 MG/1
TABLET, FILM COATED ORAL
Refills: 0 | Status: COMPLETED | COMMUNITY
End: 2022-10-31

## 2022-10-31 RX ORDER — MUPIROCIN 20 MG/G
2 OINTMENT TOPICAL
Qty: 22 | Refills: 0 | Status: COMPLETED | COMMUNITY
Start: 2017-04-03 | End: 2022-10-31

## 2022-10-31 RX ORDER — HYALURONATE SODIUM 20 MG/2 ML
20 SYRINGE (ML) INTRAARTICULAR
Qty: 1 | Refills: 0 | Status: COMPLETED | COMMUNITY
Start: 2019-10-11 | End: 2022-10-31

## 2022-10-31 RX ORDER — MUPIROCIN 20 MG/G
2 OINTMENT TOPICAL
Qty: 32 | Refills: 0 | Status: COMPLETED | COMMUNITY
Start: 2017-09-01 | End: 2022-10-31

## 2022-10-31 RX ORDER — ASPIRIN/ACETAMINOPHEN/CAFFEINE 500-325-65
325 POWDER IN PACKET (EA) ORAL
Qty: 60 | Refills: 0 | Status: COMPLETED | COMMUNITY
Start: 2017-04-28 | End: 2022-10-31

## 2022-10-31 RX ORDER — HYLAN G-F 20 16MG/2ML
16 SYRINGE (ML) INTRAARTICULAR
Qty: 1 | Refills: 0 | Status: COMPLETED | COMMUNITY
Start: 2019-11-18 | End: 2022-10-31

## 2022-10-31 RX ORDER — BRIMONIDINE TARTRATE, TIMOLOL MALEATE 2; 5 MG/ML; MG/ML
0.2-0.5 SOLUTION/ DROPS OPHTHALMIC
Qty: 20 | Refills: 0 | Status: COMPLETED | COMMUNITY
Start: 2016-08-26 | End: 2022-10-31

## 2022-10-31 RX ORDER — NEOMYCIN AND POLYMYXIN B SULFATES AND DEXAMETHASONE 3.5; 10000; 1 MG/G; [IU]/G; MG/G
3.5-10000-0.1 OINTMENT OPHTHALMIC
Qty: 4 | Refills: 0 | Status: ACTIVE | COMMUNITY
Start: 2022-06-14

## 2022-10-31 RX ORDER — CLINDAMYCIN HYDROCHLORIDE 300 MG/1
300 CAPSULE ORAL
Qty: 6 | Refills: 0 | Status: COMPLETED | COMMUNITY
Start: 2018-08-24 | End: 2022-10-31

## 2022-10-31 RX ORDER — CYCLOBENZAPRINE HYDROCHLORIDE 5 MG/1
5 TABLET, FILM COATED ORAL 3 TIMES DAILY
Qty: 60 | Refills: 0 | Status: COMPLETED | COMMUNITY
Start: 2017-12-08 | End: 2022-10-31

## 2022-10-31 RX ORDER — DORZOLAMIDE HYDROCHLORIDE AND TIMOLOL MALEATE PRESERVATIVE FREE 20; 5 MG/ML; MG/ML
2-0.5 SOLUTION/ DROPS OPHTHALMIC
Qty: 360 | Refills: 0 | Status: ACTIVE | COMMUNITY
Start: 2021-07-08

## 2022-10-31 RX ORDER — OXYCODONE AND ACETAMINOPHEN 5; 325 MG/1; MG/1
5-325 TABLET ORAL
Qty: 60 | Refills: 0 | Status: COMPLETED | COMMUNITY
Start: 2017-04-11 | End: 2022-10-31

## 2022-10-31 RX ORDER — CLINDAMYCIN HYDROCHLORIDE 300 MG/1
300 CAPSULE ORAL
Qty: 10 | Refills: 1 | Status: COMPLETED | COMMUNITY
Start: 2017-07-14 | End: 2022-10-31

## 2022-10-31 RX ORDER — HYALURONATE SODIUM 20 MG/2 ML
20 SYRINGE (ML) INTRAARTICULAR
Qty: 1 | Refills: 0 | Status: COMPLETED | COMMUNITY
Start: 2019-11-15 | End: 2022-10-31

## 2022-10-31 RX ORDER — PREDNISOLONE ACETATE 10 MG/ML
1 SUSPENSION/ DROPS OPHTHALMIC
Qty: 5 | Refills: 0 | Status: COMPLETED | COMMUNITY
Start: 2022-06-14

## 2022-10-31 NOTE — ASSESSMENT
[FreeTextEntry1] : Bilateral knee advanced valgus OA exacerbation\par She has had relief with Euflexxa in the past, will request\par CSI deferred due to glaucoma\par RTC when injections approved\par Not interetsed in total knee replacement at this time\par \par \par -The patient's diagnosis was reviewed in detail. Explained this is a chronic, progressive deteriorating condition that may need treatment from time to time as the flare-ups occur. \par -The natural progression of Osteoarthritis was explained to the patient. We discussed the possible treatment options from conservative to operative. \par -We discussed prescription strength NSAIDs, Glucosamine and Chondroitin sulfate, and Physical Therapy as well different types of injections including viscosupplementation. \par -We also discussed that at some point they may progress to needed a total knee replacement. \par \par \par \par \par \par

## 2022-10-31 NOTE — IMAGING
[de-identified] : RIGHT KNEE EXAM:\par + tender mjl\par ROM: 5-120\par strength: 5/5\par +5mm opening with varus stress\par NVA\par \par LEFT KNEE EXAM:\par + tender mjl\par ROM: 3-120\par strength: 5/5\par NVI [Bilateral] : knee bilaterally [All Views] : anteroposterior, lateral, skyline, and anteroposterior standing [advanced tricompartmental OA with lateral compartment narrowing and valgus alignment] : advanced tricompartmental OA with lateral compartment narrowing and valgus alignment

## 2022-10-31 NOTE — HISTORY OF PRESENT ILLNESS
[Left Leg] : left leg [Right Leg] : right leg [Gradual] : gradual [8] : 8 [7] : 7 [Localized] : localized [Constant] : constant [Household chores] : household chores [Rest] : rest [Standing] : standing [Walking] : walking [Stairs] : stairs [de-identified] : 10/31/22 pt presents here today with bl knees pain for years, recently exacerbated without new injury\par pt has try gel injections in the past by Dr Mckenna with good relief (last completed 6/2021) [] : no [FreeTextEntry1] : knees [FreeTextEntry9] : gel injections  [de-identified] : D5r Emiliano Mckenna  [de-identified] : nothing

## 2022-11-03 RX ORDER — HYALURONATE SODIUM 30 MG/2 ML
30 SYRINGE (ML) INTRAARTICULAR
Qty: 8 | Refills: 0 | Status: ACTIVE | COMMUNITY
Start: 2022-11-03 | End: 1900-01-01

## 2022-11-03 NOTE — OCCUPATIONAL THERAPY INITIAL EVALUATION ADULT - MD ORDER
Occupational Therapy to evaluate and treat. None Occupational Therapy to evaluate and treat. OOB to Chair. Ambulate as Tolerated. Ambulate with walker.

## 2022-11-18 ENCOUNTER — APPOINTMENT (OUTPATIENT)
Dept: ORTHOPEDIC SURGERY | Facility: CLINIC | Age: 77
End: 2022-11-18

## 2022-11-18 VITALS — WEIGHT: 165 LBS | HEIGHT: 64 IN | BODY MASS INDEX: 28.17 KG/M2

## 2022-11-18 PROCEDURE — 20610 DRAIN/INJ JOINT/BURSA W/O US: CPT | Mod: 50

## 2022-11-18 NOTE — PROCEDURE
[FreeTextEntry3] : Large joint injection was performed of the BILAT knee. \par The indication for this procedure was pain, inflammation and x-ray evidence of Osteoarthritis on this or prior visit. The site was prepped with alcohol, betadine, ethyl chloride sprayed topically and sterile technique used. \par An injection of ORTHOVISC 2ml, series #1 was used.  \par Patient was advised to call if redness, pain or fever occur, apply ice for 15 minutes out of every hour for the next 12-24 hours as tolerated and patient was advised to rest the joint(s) for 2 days. Patient has tried OTC's including aspirin, Ibuprofen, Aleve, etc or prescription NSAIDS, and/or exercises at home and/or physical therapy without satisfactory response, patient had decreased mobility in the joint and the risks benefits, and alternatives have been discussed, and verbal consent was obtained. \par

## 2022-11-18 NOTE — HISTORY OF PRESENT ILLNESS
[Left Leg] : left leg [Right Leg] : right leg [Gradual] : gradual [8] : 8 [7] : 7 [Localized] : localized [Constant] : constant [Household chores] : household chores [Rest] : rest [Standing] : standing [Walking] : walking [Stairs] : stairs [] : no [FreeTextEntry1] : knees [FreeTextEntry9] : gel injections  [de-identified] : Jass [de-identified] : nothing

## 2022-11-28 ENCOUNTER — APPOINTMENT (OUTPATIENT)
Dept: ORTHOPEDIC SURGERY | Facility: CLINIC | Age: 77
End: 2022-11-28

## 2022-11-28 PROCEDURE — 99024 POSTOP FOLLOW-UP VISIT: CPT

## 2022-11-28 PROCEDURE — 20610 DRAIN/INJ JOINT/BURSA W/O US: CPT | Mod: 50

## 2022-11-28 NOTE — HISTORY OF PRESENT ILLNESS
[Left Leg] : left leg [Right Leg] : right leg [Gradual] : gradual [8] : 8 [7] : 7 [Localized] : localized [Constant] : constant [Household chores] : household chores [Rest] : rest [Standing] : standing [Walking] : walking [Stairs] : stairs [de-identified] : 11/28/22 bl knees orthovisc injections, #2 [] : no [FreeTextEntry1] : knees [FreeTextEntry9] : gel injections  [de-identified] : Jass [de-identified] : orthovisc injections

## 2022-12-02 ENCOUNTER — APPOINTMENT (OUTPATIENT)
Dept: ORTHOPEDIC SURGERY | Facility: CLINIC | Age: 77
End: 2022-12-02

## 2022-12-02 PROCEDURE — 20610 DRAIN/INJ JOINT/BURSA W/O US: CPT | Mod: RT

## 2022-12-02 PROCEDURE — 99024 POSTOP FOLLOW-UP VISIT: CPT

## 2022-12-02 NOTE — PROCEDURE
[FreeTextEntry3] : Large joint injection was performed of the BILAT knee. \par The indication for this procedure was pain, inflammation and x-ray evidence of Osteoarthritis on this or prior visit. The site was prepped with alcohol, betadine, ethyl chloride sprayed topically and sterile technique used. \par An injection of ORTHOVISC 2ml, series #3 was used.  \par Patient was advised to call if redness, pain or fever occur, apply ice for 15 minutes out of every hour for the next 12-24 hours as tolerated and patient was advised to rest the joint(s) for 2 days. Patient has tried OTC's including aspirin, Ibuprofen, Aleve, etc or prescription NSAIDS, and/or exercises at home and/or physical therapy without satisfactory response, patient had decreased mobility in the joint and the risks benefits, and alternatives have been discussed, and verbal consent was obtained. \par

## 2022-12-02 NOTE — ASSESSMENT
[FreeTextEntry1] : ORTHOVISC B/L KNEES #3, TOLERATED WELL\par RTO 1 WEEK TO CONTINUE SERIES\par DISCUSSED POSSIBLE BAKER CYST ASP IF POSTERIOR SYMPTOMS PERSIST AFTER COMPLETION OF VISCO\par

## 2022-12-02 NOTE — HISTORY OF PRESENT ILLNESS
[Left Leg] : left leg [Right Leg] : right leg [Gradual] : gradual [8] : 8 [7] : 7 [Localized] : localized [Constant] : constant [Household chores] : household chores [Rest] : rest [Standing] : standing [Walking] : walking [Stairs] : stairs [de-identified] : 11/28/22 bl knees orthovisc injections, #2\par 12/2/22: ORTHOVISC #2 B/L KNEES [] : no [FreeTextEntry1] : knees [FreeTextEntry9] : gel injections  [de-identified] : Jass [de-identified] : orthovisc injections

## 2022-12-02 NOTE — REVIEW OF SYSTEMS
[Negative] : Heme/Lymph Pt is a 57 yo  female, single, domiciled at [place] [facility], with pphx of schizophrenia, multiple IPP admissions, last at [facility], discharged 8/22/18, with one self-reported suicide attempts who presents to [facility] ED after pt ran out of her psychiatric facility and was walking within the street and causing traffic disturbance, pt brought in by EMT for being an EDP. Psychiatry consulted for mental health evaluation. Pt currently presents with disorganized thoughts and behavior in the ED and aggressive behavior this morning at her psychiatric facility is in the context of medication noncompliance x 1week, in addition to continuous decompensation at facility for last 1.5 months. Pt has been found to run out of her facility at baseline, and [place] is aware of the need for more supervised care for pt and has initiated the process of trying to transfer pt to a nursing home. Pt has a psychiatrist at her psychiatric community who pt sees regularly, Dr. Chris [phone] Pt is a 57 yo  female, single, domiciled at [place] [facility], with pphx of schizophrenia, multiple IPP admissions, last at [facility], discharged 8/22/18, with one self-reported suicide attempts who presents to [facility] ED after pt ran out of her psychiatric facility and was walking within the street and causing traffic disturbance, pt brought in by EMT for being an EDP. Psychiatry consulted for mental health evaluation. Pt currently presents with disorganized thoughts and behavior in the ED and presented with aggressive behavior this morning at her psychiatric facility which is in the context of medication noncompliance x 1week. Although pt has been found to run out of her facility at baseline, and [place] is aware of the need for more supervised care for pt and has initiated the process of trying to transfer pt to a nursing home. However, because pt is currently at increased risk due to her aggressive behavior at [place], her dangerous behavior of running into the street and causing traffic disturbances, and medication noncompliance, pt is a risk to herself and others at this time and warrants emergent IPP admission. Pt will benefit of re-starting her home medications, and continuing to keep pt on 1:1 observation until her behavior has been established as not harmful to herself or others.    Plan:  #Schizophrenia  Olanzapine 10mg qhs  Gabapentin 300mg TID  Invega Sustenna 234mg [id number], next due 10-9-18 (last given 9-11-18)  Cogentin .5mg BID    #HTN  Norvasc 5mg qd  Hydralazine 10mg qd    #HLD  Atorvostatin 40mg qhs    #DM type 1  Lantus 15 units qhs    #supplements  - Multivitamin qd  - Vitamin D3 2000 units qd Pt is a 55 yo  female, single, domiciled at [place] [facility], with pphx of schizophrenia, multiple IPP admissions, last at [facility], discharged 8/22/18, with one self-reported suicide attempts who presents to [facility] ED after pt ran out of her psychiatric facility and was walking within the street and causing traffic disturbance, pt brought in by EMT for being an EDP. Psychiatry consulted for mental health evaluation. Pt currently presents with disorganized thoughts and behavior in the ED and presented with aggressive behavior this morning at her psychiatric facility which is in the context of medication noncompliance x 1week. Although pt has been found to run out of her facility at baseline, and [place] is aware of the need for more supervised care for pt and has initiated the process of trying to transfer pt to a nursing home. However, because pt is currently at increased risk due to her aggressive behavior at [place], her dangerous behavior of running into the street and causing traffic disturbances, and medication noncompliance, pt is a risk to herself and others at this time and warrants emergent IPP admission. Pt will benefit of re-starting her home medications, and continuing to keep pt on 1:1 observation until her behavior has been established as not harmful to herself or others.    Plan:  #Schizophrenia  Olanzapine 10mg qhs  Gabapentin 300mg TID  Invega Sustenna 234mg [id number], next due 10-9-18 (last given 9-11-18)  Cogentin .5mg BID    #HTN  Norvasc 5mg qd  Hydralazine 10mg qd    #HLD  Atorvostatin 40mg qhs    #DM type 1  Lantus 15 units qhs  Januvia (nonformulary) 50units qd    #supplements  - Multivitamin qd  - Vitamin D3 2000 units qd Pt is a 55 yo  female, single, domiciled at [place] [facility], with pphx of schizophrenia, multiple IPP admissions, last at [facility], discharged 8/22/18, with one self-reported suicide attempts who presents to [facility] ED after pt ran out of her psychiatric facility and was walking within the street and causing traffic disturbance, pt brought in by EMT for being an EDP. Psychiatry consulted for mental health evaluation. Pt currently presents with disorganized thoughts and behavior in the ED and presented with aggressive behavior this morning at her psychiatric facility which is in the context of medication noncompliance x 1week. Although pt has been found to run out of her facility at baseline, and [place] is aware of the need for more supervised care for pt and has initiated the process of trying to transfer pt to a nursing home. However, because pt is currently at increased risk due to her aggressive behavior at [place], her dangerous behavior of running into the street and causing traffic disturbances, and medication noncompliance, pt is a risk to herself and others at this time and warrants emergent IPP admission. Pt will benefit of re-starting her home medications, and continuing to keep pt on 1:1 observation until her behavior has been established as not harmful to herself or others.    Plan: (Pt is allergic to Haldol, Clozapine and Prolixin, per [place])  #Schizophrenia  Olanzapine 10mg qhs  Gabapentin 300mg TID  Invega Sustenna 234mg [id number], next due 10-9-18 (last given 9-11-18)  Cogentin .5mg BID    #HTN  Norvasc 5mg qd  Hydralazine 10mg qd    #HLD  Atorvostatin 40mg qhs    #DM type 1  Lantus 15 units qhs  Januvia (nonformulary) 50units qd    #supplements  - Multivitamin qd  - Vitamin D3 2000 units qd

## 2022-12-09 ENCOUNTER — APPOINTMENT (OUTPATIENT)
Dept: ORTHOPEDIC SURGERY | Facility: CLINIC | Age: 77
End: 2022-12-09

## 2022-12-09 PROCEDURE — 99024 POSTOP FOLLOW-UP VISIT: CPT

## 2022-12-09 PROCEDURE — 20610 DRAIN/INJ JOINT/BURSA W/O US: CPT | Mod: 50

## 2022-12-09 NOTE — HISTORY OF PRESENT ILLNESS
[de-identified] : 11/28/22 bl knees orthovisc injections, #2\par 12/2/22: ORTHOVISC #2 B/L KNEES\par 12/9/22: orthovisc #4 b/l knees

## 2022-12-09 NOTE — PROCEDURE
[FreeTextEntry3] : Large joint injection was performed of the BILAT knee. \par The indication for this procedure was pain, inflammation and x-ray evidence of Osteoarthritis on this or prior visit. The site was prepped with alcohol, betadine, ethyl chloride sprayed topically and sterile technique used. \par An injection of ORTHOVISC 2ml, series #4 was used.  \par Patient was advised to call if redness, pain or fever occur, apply ice for 15 minutes out of every hour for the next 12-24 hours as tolerated and patient was advised to rest the joint(s) for 2 days. Patient has tried OTC's including aspirin, Ibuprofen, Aleve, etc or prescription NSAIDS, and/or exercises at home and/or physical therapy without satisfactory response, patient had decreased mobility in the joint and the risks benefits, and alternatives have been discussed, and verbal consent was obtained. \par

## 2022-12-09 NOTE — ASSESSMENT
[FreeTextEntry1] : ORTHOVISC B/L KNEES #4, TOLERATED WELL\par DISCUSSED TIMING OF INJECTIONS\par CONTINUES TO HAVE POSTERIOR LATERAL PAIN IN RIGHT KNEE - SHE IS LEAVING FOR [place] FOR THE HOLIDAYS, WILL CALL IN JANUARY IF SYMPTOMS PERSIST, POSSIBLY CONSIDER EVAL CRAWFORD CYST\par SHE IS NOT READY FOR TKA\par

## 2023-03-20 ENCOUNTER — FORM ENCOUNTER (OUTPATIENT)
Age: 78
End: 2023-03-20

## 2023-03-21 ENCOUNTER — APPOINTMENT (OUTPATIENT)
Dept: MRI IMAGING | Facility: CLINIC | Age: 78
End: 2023-03-21
Payer: MEDICARE

## 2023-03-21 PROCEDURE — 73721 MRI JNT OF LWR EXTRE W/O DYE: CPT | Mod: RT

## 2023-03-21 NOTE — PHYSICAL THERAPY INITIAL EVALUATION ADULT - PERSONAL SAFETY AND JUDGMENT, REHAB EVAL
intact Hydroxychloroquine Pregnancy And Lactation Text: This medication has been shown to cause fetal harm but it isn't assigned a Pregnancy Risk Category. There are small amounts excreted in breast milk.

## 2023-03-31 ENCOUNTER — APPOINTMENT (OUTPATIENT)
Dept: ORTHOPEDIC SURGERY | Facility: CLINIC | Age: 78
End: 2023-03-31
Payer: MEDICARE

## 2023-03-31 VITALS — BODY MASS INDEX: 28.17 KG/M2 | HEIGHT: 64 IN | WEIGHT: 165 LBS

## 2023-03-31 PROCEDURE — 99213 OFFICE O/P EST LOW 20 MIN: CPT

## 2023-03-31 NOTE — IMAGING
[de-identified] : Bilat valgus deformity\par Mild effusion, no warmth, no ecchymosis\par Medial joint line tenderness to palpation \par Range of motion 0-110 with associated crepitus\par 5/5 quadriceps and hamstring strength\par Ligamentously stable\par Motor and sensory intact distally\par Mildly antalgic gait\par Negative Carmela\par

## 2023-03-31 NOTE — ASSESSMENT
[FreeTextEntry1] : REVIEWED MRI, ADVANCED OA, NO FRACTURE\par UNABLE TO DO TKA DUE TO HER CURRENT LIVING SITUATION\par SET UP ZILRETTA FOR BOTH KNEES\par REPEAT VISCO 6/9/23\par

## 2023-03-31 NOTE — HISTORY OF PRESENT ILLNESS
[Right Leg] : right leg [Gradual] : gradual [6] : 6 [5] : 5 [Localized] : localized [Intermittent] : intermittent [Household chores] : household chores [Rest] : rest [Walking] : walking [de-identified] : 11/28/22 bl knees orthovisc injections, #2\par 12/2/22: ORTHOVISC #2 B/L KNEES\par 12/9/22: orthovisc #4 b/l knees\par \par 03/31/2023 here to review mri results on the right knee [] : no [FreeTextEntry1] : knee [de-identified] : mri done at [facility] [de-identified] : orthovisc injections

## 2023-04-25 ENCOUNTER — APPOINTMENT (OUTPATIENT)
Dept: RADIOLOGY | Facility: CLINIC | Age: 78
End: 2023-04-25
Payer: MEDICARE

## 2023-04-25 PROCEDURE — 72040 X-RAY EXAM NECK SPINE 2-3 VW: CPT

## 2023-04-25 PROCEDURE — 72070 X-RAY EXAM THORAC SPINE 2VWS: CPT

## 2023-04-25 PROCEDURE — 73030 X-RAY EXAM OF SHOULDER: CPT | Mod: 50

## 2023-05-20 ENCOUNTER — NON-APPOINTMENT (OUTPATIENT)
Age: 78
End: 2023-05-20

## 2023-05-25 ENCOUNTER — APPOINTMENT (OUTPATIENT)
Dept: MRI IMAGING | Facility: CLINIC | Age: 78
End: 2023-05-25
Payer: MEDICARE

## 2023-05-25 PROCEDURE — 70553 MRI BRAIN STEM W/O & W/DYE: CPT

## 2023-05-25 PROCEDURE — A9585: CPT

## 2023-05-25 PROCEDURE — 72156 MRI NECK SPINE W/O & W/DYE: CPT

## 2023-08-21 ENCOUNTER — APPOINTMENT (OUTPATIENT)
Dept: ORTHOPEDIC SURGERY | Facility: CLINIC | Age: 78
End: 2023-08-21
Payer: MEDICARE

## 2023-08-21 VITALS — WEIGHT: 165 LBS | BODY MASS INDEX: 28.17 KG/M2 | HEIGHT: 64 IN

## 2023-08-21 PROCEDURE — 20610 DRAIN/INJ JOINT/BURSA W/O US: CPT | Mod: 50

## 2023-08-21 PROCEDURE — J3490M: CUSTOM

## 2023-08-21 PROCEDURE — 99213 OFFICE O/P EST LOW 20 MIN: CPT | Mod: 25

## 2023-08-21 NOTE — HISTORY OF PRESENT ILLNESS
[Right Leg] : right leg [Gradual] : gradual [6] : 6 [5] : 5 [Localized] : localized [Intermittent] : intermittent [Household chores] : household chores [Rest] : rest [Walking] : walking [de-identified] : 11/28/22 bl knees orthovisc injections, #2 12/2/22: ORTHOVISC #2 B/L KNEES 12/9/22: orthovisc #4 b/l knees  03/31/2023 here to review mri results on the right knee  08/21/23 follow up bl knees finished orthovisc with little relief  [] : no [FreeTextEntry1] : knee [de-identified] : mri done at [facility] [de-identified] : orthovisc injections

## 2023-08-21 NOTE — PROCEDURE
[FreeTextEntry3] : - Large joint injection was performed of the BILAT knee.  - The indication for this procedure was pain and inflammation. Patient has tried OTC's including aspirin, Ibuprofen, Aleve, etc or prescription NSAIDS, and/or exercises at home and/or physical therapy without satisfactory response, patient had decreased mobility in the joint and the risks benefits, and alternatives have been discussed, and verbal consent was obtained. The site was prepped with alcohol, betadine, ethyl chloride sprayed topically and sterile technique used.  - An injection of ZILRETTA 5CC; Marcaine 5cc injected. - Patient was advised to call if redness, pain or fever occur, apply ice for 15 minutes out of every hour for the next 12-24 hours as tolerated and patient was advised to rest the joint(s) for 2 days.

## 2023-08-21 NOTE — IMAGING
[de-identified] : Bilat valgus deformity\par  Mild effusion, no warmth, no ecchymosis\par  Medial joint line tenderness to palpation \par  Range of motion 0-110 with associated crepitus\par  5/5 quadriceps and hamstring strength\par  Ligamentously stable\par  Motor and sensory intact distally\par  Mildly antalgic gait\par  Negative Carmela\par

## 2023-08-21 NOTE — ASSESSMENT
[FreeTextEntry1] : EXACERBATION OF ADVANCED KNEE OA LIVES ALONE, SHE TELLS ME TKA IS NOT AN OPTION MAUREEN BILAT KNEES TODAY, NO RELIEF WITH REGULAR CSI CONSIDER VISCO AGAIN IF NO IMPROVEMENT WITH ZILRETTA

## 2023-08-23 RX ORDER — HYALURONATE SODIUM 30 MG/2 ML
30 SYRINGE (ML) INTRAARTICULAR
Qty: 4 | Refills: 0 | Status: ACTIVE | COMMUNITY
Start: 2023-08-23 | End: 1900-01-01

## 2023-09-11 NOTE — ASU PREOP CHECKLIST - 1.
Warm blanket Warm blanket/// locker # 51 top Warm blanket///  PACU locker # 51 top Libtayo Pregnancy And Lactation Text: This medication is contraindicated in pregnancy and when breast feeding.

## 2023-10-25 ENCOUNTER — APPOINTMENT (OUTPATIENT)
Dept: MRI IMAGING | Facility: CLINIC | Age: 78
End: 2023-10-25
Payer: MEDICARE

## 2023-10-25 PROCEDURE — 73720 MRI LWR EXTREMITY W/O&W/DYE: CPT | Mod: LT

## 2023-10-27 ENCOUNTER — APPOINTMENT (OUTPATIENT)
Dept: ORTHOPEDIC SURGERY | Facility: CLINIC | Age: 78
End: 2023-10-27

## 2023-10-31 ENCOUNTER — NON-APPOINTMENT (OUTPATIENT)
Age: 78
End: 2023-10-31

## 2023-11-01 ENCOUNTER — EMERGENCY (EMERGENCY)
Facility: HOSPITAL | Age: 78
LOS: 1 days | Discharge: HOME | End: 2023-11-01
Attending: EMERGENCY MEDICINE | Admitting: EMERGENCY MEDICINE
Payer: MEDICARE

## 2023-11-01 VITALS
DIASTOLIC BLOOD PRESSURE: 76 MMHG | HEIGHT: 64 IN | OXYGEN SATURATION: 97 % | TEMPERATURE: 97 F | WEIGHT: 149.91 LBS | SYSTOLIC BLOOD PRESSURE: 109 MMHG | HEART RATE: 88 BPM | RESPIRATION RATE: 18 BRPM

## 2023-11-01 DIAGNOSIS — L05.91 PILONIDAL CYST WITHOUT ABSCESS: Chronic | ICD-10-CM

## 2023-11-01 DIAGNOSIS — H26.9 UNSPECIFIED CATARACT: Chronic | ICD-10-CM

## 2023-11-01 DIAGNOSIS — Z98.890 OTHER SPECIFIED POSTPROCEDURAL STATES: Chronic | ICD-10-CM

## 2023-11-01 DIAGNOSIS — Z96.649 PRESENCE OF UNSPECIFIED ARTIFICIAL HIP JOINT: Chronic | ICD-10-CM

## 2023-11-01 DIAGNOSIS — Z85.828 PERSONAL HISTORY OF OTHER MALIGNANT NEOPLASM OF SKIN: Chronic | ICD-10-CM

## 2023-11-01 LAB
ALBUMIN SERPL ELPH-MCNC: 3.9 G/DL — SIGNIFICANT CHANGE UP (ref 3.3–5)
ALBUMIN SERPL ELPH-MCNC: 3.9 G/DL — SIGNIFICANT CHANGE UP (ref 3.3–5)
ALP SERPL-CCNC: 93 U/L — SIGNIFICANT CHANGE UP (ref 40–120)
ALP SERPL-CCNC: 93 U/L — SIGNIFICANT CHANGE UP (ref 40–120)
ALT FLD-CCNC: 21 U/L — SIGNIFICANT CHANGE UP (ref 12–78)
ALT FLD-CCNC: 21 U/L — SIGNIFICANT CHANGE UP (ref 12–78)
ANION GAP SERPL CALC-SCNC: 5 MMOL/L — SIGNIFICANT CHANGE UP (ref 5–17)
ANION GAP SERPL CALC-SCNC: 5 MMOL/L — SIGNIFICANT CHANGE UP (ref 5–17)
AST SERPL-CCNC: 15 U/L — SIGNIFICANT CHANGE UP (ref 15–37)
AST SERPL-CCNC: 15 U/L — SIGNIFICANT CHANGE UP (ref 15–37)
BASOPHILS # BLD AUTO: 0.06 K/UL — SIGNIFICANT CHANGE UP (ref 0–0.2)
BASOPHILS # BLD AUTO: 0.06 K/UL — SIGNIFICANT CHANGE UP (ref 0–0.2)
BASOPHILS NFR BLD AUTO: 0.4 % — SIGNIFICANT CHANGE UP (ref 0–2)
BASOPHILS NFR BLD AUTO: 0.4 % — SIGNIFICANT CHANGE UP (ref 0–2)
BILIRUB SERPL-MCNC: 0.5 MG/DL — SIGNIFICANT CHANGE UP (ref 0.2–1.2)
BILIRUB SERPL-MCNC: 0.5 MG/DL — SIGNIFICANT CHANGE UP (ref 0.2–1.2)
BUN SERPL-MCNC: 24 MG/DL — HIGH (ref 7–23)
BUN SERPL-MCNC: 24 MG/DL — HIGH (ref 7–23)
CALCIUM SERPL-MCNC: 9.6 MG/DL — SIGNIFICANT CHANGE UP (ref 8.5–10.1)
CALCIUM SERPL-MCNC: 9.6 MG/DL — SIGNIFICANT CHANGE UP (ref 8.5–10.1)
CHLORIDE SERPL-SCNC: 105 MMOL/L — SIGNIFICANT CHANGE UP (ref 96–108)
CHLORIDE SERPL-SCNC: 105 MMOL/L — SIGNIFICANT CHANGE UP (ref 96–108)
CO2 SERPL-SCNC: 28 MMOL/L — SIGNIFICANT CHANGE UP (ref 22–31)
CO2 SERPL-SCNC: 28 MMOL/L — SIGNIFICANT CHANGE UP (ref 22–31)
CREAT SERPL-MCNC: 1 MG/DL — SIGNIFICANT CHANGE UP (ref 0.5–1.3)
CREAT SERPL-MCNC: 1 MG/DL — SIGNIFICANT CHANGE UP (ref 0.5–1.3)
EGFR: 58 ML/MIN/1.73M2 — LOW
EGFR: 58 ML/MIN/1.73M2 — LOW
EOSINOPHIL # BLD AUTO: 0.03 K/UL — SIGNIFICANT CHANGE UP (ref 0–0.5)
EOSINOPHIL # BLD AUTO: 0.03 K/UL — SIGNIFICANT CHANGE UP (ref 0–0.5)
EOSINOPHIL NFR BLD AUTO: 0.2 % — SIGNIFICANT CHANGE UP (ref 0–6)
EOSINOPHIL NFR BLD AUTO: 0.2 % — SIGNIFICANT CHANGE UP (ref 0–6)
GLUCOSE SERPL-MCNC: 136 MG/DL — HIGH (ref 70–99)
GLUCOSE SERPL-MCNC: 136 MG/DL — HIGH (ref 70–99)
HCT VFR BLD CALC: 42.2 % — SIGNIFICANT CHANGE UP (ref 34.5–45)
HCT VFR BLD CALC: 42.2 % — SIGNIFICANT CHANGE UP (ref 34.5–45)
HGB BLD-MCNC: 14.3 G/DL — SIGNIFICANT CHANGE UP (ref 11.5–15.5)
HGB BLD-MCNC: 14.3 G/DL — SIGNIFICANT CHANGE UP (ref 11.5–15.5)
IMM GRANULOCYTES NFR BLD AUTO: 0.3 % — SIGNIFICANT CHANGE UP (ref 0–0.9)
IMM GRANULOCYTES NFR BLD AUTO: 0.3 % — SIGNIFICANT CHANGE UP (ref 0–0.9)
LYMPHOCYTES # BLD AUTO: 1.79 K/UL — SIGNIFICANT CHANGE UP (ref 1–3.3)
LYMPHOCYTES # BLD AUTO: 1.79 K/UL — SIGNIFICANT CHANGE UP (ref 1–3.3)
LYMPHOCYTES # BLD AUTO: 12.1 % — LOW (ref 13–44)
LYMPHOCYTES # BLD AUTO: 12.1 % — LOW (ref 13–44)
MAGNESIUM SERPL-MCNC: 2.3 MG/DL — SIGNIFICANT CHANGE UP (ref 1.6–2.6)
MAGNESIUM SERPL-MCNC: 2.3 MG/DL — SIGNIFICANT CHANGE UP (ref 1.6–2.6)
MCHC RBC-ENTMCNC: 31.6 PG — SIGNIFICANT CHANGE UP (ref 27–34)
MCHC RBC-ENTMCNC: 31.6 PG — SIGNIFICANT CHANGE UP (ref 27–34)
MCHC RBC-ENTMCNC: 33.9 GM/DL — SIGNIFICANT CHANGE UP (ref 32–36)
MCHC RBC-ENTMCNC: 33.9 GM/DL — SIGNIFICANT CHANGE UP (ref 32–36)
MCV RBC AUTO: 93.2 FL — SIGNIFICANT CHANGE UP (ref 80–100)
MCV RBC AUTO: 93.2 FL — SIGNIFICANT CHANGE UP (ref 80–100)
MONOCYTES # BLD AUTO: 0.97 K/UL — HIGH (ref 0–0.9)
MONOCYTES # BLD AUTO: 0.97 K/UL — HIGH (ref 0–0.9)
MONOCYTES NFR BLD AUTO: 6.6 % — SIGNIFICANT CHANGE UP (ref 2–14)
MONOCYTES NFR BLD AUTO: 6.6 % — SIGNIFICANT CHANGE UP (ref 2–14)
NEUTROPHILS # BLD AUTO: 11.89 K/UL — HIGH (ref 1.8–7.4)
NEUTROPHILS # BLD AUTO: 11.89 K/UL — HIGH (ref 1.8–7.4)
NEUTROPHILS NFR BLD AUTO: 80.4 % — HIGH (ref 43–77)
NEUTROPHILS NFR BLD AUTO: 80.4 % — HIGH (ref 43–77)
NRBC # BLD: 0 /100 WBCS — SIGNIFICANT CHANGE UP (ref 0–0)
NRBC # BLD: 0 /100 WBCS — SIGNIFICANT CHANGE UP (ref 0–0)
NT-PROBNP SERPL-SCNC: 54 PG/ML — SIGNIFICANT CHANGE UP (ref 0–450)
NT-PROBNP SERPL-SCNC: 54 PG/ML — SIGNIFICANT CHANGE UP (ref 0–450)
PLATELET # BLD AUTO: 290 K/UL — SIGNIFICANT CHANGE UP (ref 150–400)
PLATELET # BLD AUTO: 290 K/UL — SIGNIFICANT CHANGE UP (ref 150–400)
POTASSIUM SERPL-MCNC: 4.9 MMOL/L — SIGNIFICANT CHANGE UP (ref 3.5–5.3)
POTASSIUM SERPL-MCNC: 4.9 MMOL/L — SIGNIFICANT CHANGE UP (ref 3.5–5.3)
POTASSIUM SERPL-SCNC: 4.9 MMOL/L — SIGNIFICANT CHANGE UP (ref 3.5–5.3)
POTASSIUM SERPL-SCNC: 4.9 MMOL/L — SIGNIFICANT CHANGE UP (ref 3.5–5.3)
PROT SERPL-MCNC: 7.8 G/DL — SIGNIFICANT CHANGE UP (ref 6–8.3)
PROT SERPL-MCNC: 7.8 G/DL — SIGNIFICANT CHANGE UP (ref 6–8.3)
RBC # BLD: 4.53 M/UL — SIGNIFICANT CHANGE UP (ref 3.8–5.2)
RBC # BLD: 4.53 M/UL — SIGNIFICANT CHANGE UP (ref 3.8–5.2)
RBC # FLD: 12.7 % — SIGNIFICANT CHANGE UP (ref 10.3–14.5)
RBC # FLD: 12.7 % — SIGNIFICANT CHANGE UP (ref 10.3–14.5)
SODIUM SERPL-SCNC: 138 MMOL/L — SIGNIFICANT CHANGE UP (ref 135–145)
SODIUM SERPL-SCNC: 138 MMOL/L — SIGNIFICANT CHANGE UP (ref 135–145)
TROPONIN I, HIGH SENSITIVITY RESULT: 6.3 NG/L — SIGNIFICANT CHANGE UP
TROPONIN I, HIGH SENSITIVITY RESULT: 6.3 NG/L — SIGNIFICANT CHANGE UP
TROPONIN I, HIGH SENSITIVITY RESULT: 6.6 NG/L — SIGNIFICANT CHANGE UP
TROPONIN I, HIGH SENSITIVITY RESULT: 6.6 NG/L — SIGNIFICANT CHANGE UP
WBC # BLD: 14.79 K/UL — HIGH (ref 3.8–10.5)
WBC # BLD: 14.79 K/UL — HIGH (ref 3.8–10.5)
WBC # FLD AUTO: 14.79 K/UL — HIGH (ref 3.8–10.5)
WBC # FLD AUTO: 14.79 K/UL — HIGH (ref 3.8–10.5)

## 2023-11-01 PROCEDURE — 85025 COMPLETE CBC W/AUTO DIFF WBC: CPT

## 2023-11-01 PROCEDURE — 80053 COMPREHEN METABOLIC PANEL: CPT

## 2023-11-01 PROCEDURE — 93005 ELECTROCARDIOGRAM TRACING: CPT

## 2023-11-01 PROCEDURE — 93010 ELECTROCARDIOGRAM REPORT: CPT

## 2023-11-01 PROCEDURE — 83735 ASSAY OF MAGNESIUM: CPT

## 2023-11-01 PROCEDURE — 84484 ASSAY OF TROPONIN QUANT: CPT

## 2023-11-01 PROCEDURE — 99285 EMERGENCY DEPT VISIT HI MDM: CPT

## 2023-11-01 PROCEDURE — 36415 COLL VENOUS BLD VENIPUNCTURE: CPT

## 2023-11-01 PROCEDURE — 99285 EMERGENCY DEPT VISIT HI MDM: CPT | Mod: 25

## 2023-11-01 PROCEDURE — 71045 X-RAY EXAM CHEST 1 VIEW: CPT | Mod: 26

## 2023-11-01 PROCEDURE — 71045 X-RAY EXAM CHEST 1 VIEW: CPT

## 2023-11-01 PROCEDURE — 83880 ASSAY OF NATRIURETIC PEPTIDE: CPT

## 2023-11-01 NOTE — ED ADULT NURSE NOTE - NSFALLRISKINTERV_ED_ALL_ED
Assistance OOB with selected safe patient handling equipment if applicable/Communicate fall risk and risk factors to all staff, patient, and family/Orthostatic vital signs/Provide visual cue: yellow wristband, yellow gown, etc/Reinforce activity limits and safety measures with patient and family/Call bell, personal items and telephone in reach/Instruct patient to call for assistance before getting out of bed/chair/stretcher/Non-slip footwear applied when patient is off stretcher/[place] to call system/Physically safe environment - no spills, clutter or unnecessary equipment/Purposeful Proactive Rounding/Room/bathroom lighting operational, light cord in reach no

## 2023-11-01 NOTE — CONSULT NOTE ADULT - SUBJECTIVE AND OBJECTIVE BOX
[facility] - Fanta, Mahin, Matilda, Jean Paul, Morgan, Donte, Hansel; Office Number: [phone]    Initial Consult Note  CHIEF COMPLAINT: Patient is a 78y old  Female who presents with a chief complaint of syncope     HPI:  78y Female PMH Glaucoma, HTN, HLD, Bilateral hip replacement, DDD in cervical spine, Basal Cell Carcinoma/melanoma, s/p resection, lymph node dissection LUE s/p lymph node cancer and treatment 2011 p/w complaining of syncope sent from urgent care this morning for evaluation episode of syncope this morning and episode of cp on Saturday night. She had constant substernal pain ssaturday night; has since resolved. She was taking a shower this morning, felt nausea after getting out of the shower,"blacked out" and woke up on the floor, and had chills after episode. No h/o syncopal episodes. She states she did not have symptoms prior to passing out other than feeling nauseous. Denies having any pain or injuries. Pt states she feels back to baseline now.    In ED, T(F): 97, HR: 88, BP: 109/76, RR: 18, SpO2: 97%. Labs remarkable for WBC 14.79, Troponin HsI 6.3,BNP 54. EKG showed SR @ 81, LAFB, LVH.     Allergies  penicillin (Hives)      PAST MEDICAL & SURGICAL HISTORY:    MEDICATIONS  (STANDING):    MEDICATIONS  (PRN):    FAMILY HISTORY:  No family history of acute MI or sudden cardiac death.    SOCIAL HISTORY: No active tobacco, ethanol, or drug abuse.    REVIEW OF SYSTEMS   All other review of systems is negative unless indicated above.    VITAL SIGNS:   Vital Signs Last 24 Hrs  T(C): 36.1 (01 Nov 2023 13:21), Max: 36.1 (01 Nov 2023 13:21)  T(F): 97 (01 Nov 2023 13:21), Max: 97 (01 Nov 2023 13:21)  HR: 88 (01 Nov 2023 13:21) (88 - 88)  BP: 109/76 (01 Nov 2023 13:21) (109/76 - 109/76)  BP(mean): --  RR: 18 (01 Nov 2023 13:21) (18 - 18)  SpO2: 97% (01 Nov 2023 13:21) (97% - 97%)    Parameters below as of 01 Nov 2023 13:21  Patient On (Oxygen Delivery Method): room air        Physical Exam:  Constitutional: NAD, awake and alert  HEENT: Moist Mucous Membranes, Anicteric  Pulmonary: Non-labored, breath sounds are clear bilaterally, No wheezing, rales or rhonchi  Cardiovascular: Regular, S1 and S2, No murmurs, No rubs, gallops or clicks  Gastrointestinal: Bowel Sounds present, soft, nontender.   Lymph: No peripheral edema. No lymphadenopathy.  Skin: No visible rashes or ulcers.  Psych:  Mood & affect appropriate    I&O's Summary      LABS: All Labs Reviewed:                        14.3   14.79 )-----------( 290      ( 01 Nov 2023 13:55 )             42.2     01 Nov 2023 13:55    138    |  105    |  24     ----------------------------<  136    4.9     |  28     |  1.00     Ca    9.6        01 Nov 2023 13:55  Mg     2.3       01 Nov 2023 13:55    TPro  7.8    /  Alb  3.9    /  TBili  0.5    /  DBili  x      /  AST  15     /  ALT  21     /  AlkPhos  93     01 Nov 2023 13:55    Troponin I, High Sensitivity Result: 6.3 ng/L (11-01-23 @ 13:55)      12 Lead ECG:   Ventricular Rate 81 BPM    Atrial Rate 81 BPM    P-R Interval 176 ms    QRS Duration 90 ms    Q-T Interval 382 ms    QTC Calculation(Bazett) 443 ms    P Axis 41 degrees    R Axis -56 degrees    T Axis 37 degrees    Diagnosis Line Normal sinus rhythm  Left anterior fascicular block  Left ventricular hypertrophy ( R in aVL , [place] product , Romhilt-Culp )  Abnormal ECG  No previous ECGs available  Confirmed by Timi Stockton MD ([id number]) on 11/1/2023 2:38:48 PM (11-01-23 @ 13:32)     [facility] - [place], Mahin, Matilda, Jean Paul, Morgan, Donte, Hansel; Office Number: [phone]    Initial Consult Note  CHIEF COMPLAINT: Patient is a 78y old  Female who presents with a chief complaint of syncope     HPI:  78y Female PMH Glaucoma, HTN, HLD, Bilateral hip replacement, DDD in cervical spine, Basal Cell Carcinoma/melanoma, s/p resection, lymph node dissection LUE s/p lymph node cancer and treatment 2011 p/w complaining of syncope sent from urgent care this morning for evaluation episode of syncope this morning and episode of cp on Saturday night. She had constant substernal pain ssaturday night; has since resolved. She was taking a shower this morning, felt nausea after getting out of the shower,"blacked out" and woke up on the floor, and had chills after episode. No h/o syncopal episodes. She states she did not have symptoms prior to passing out other than feeling nauseous. Denies having any pain or injuries. Pt states she feels back to baseline now.    In ED, T(F): 97, HR: 88, BP: 109/76, RR: 18, SpO2: 97%. Labs remarkable for WBC 14.79, Troponin HsI 6.3,BNP 54. EKG showed SR @ 81, LAFB, LVH. Asymp[tomatic at present. She sis not eat or drink all day after the episode     Allergies  penicillin (Hives)      PAST MEDICAL & SURGICAL HISTORY:    MEDICATIONS  (STANDING):    MEDICATIONS  (PRN):    FAMILY HISTORY:  No family history of acute MI or sudden cardiac death.    SOCIAL HISTORY: No active tobacco, ethanol, or drug abuse.    REVIEW OF SYSTEMS   All other review of systems is negative unless indicated above.    VITAL SIGNS:   Vital Signs Last 24 Hrs  T(C): 36.1 (01 Nov 2023 13:21), Max: 36.1 (01 Nov 2023 13:21)  T(F): 97 (01 Nov 2023 13:21), Max: 97 (01 Nov 2023 13:21)  HR: 88 (01 Nov 2023 13:21) (88 - 88)  BP: 109/76 (01 Nov 2023 13:21) (109/76 - 109/76)  BP(mean): --  RR: 18 (01 Nov 2023 13:21) (18 - 18)  SpO2: 97% (01 Nov 2023 13:21) (97% - 97%)    Parameters below as of 01 Nov 2023 13:21  Patient On (Oxygen Delivery Method): room air        Physical Exam:  Constitutional: NAD, awake and alert  HEENT: Moist Mucous Membranes, Anicteric  Pulmonary: Non-labored, breath sounds are clear bilaterally, No wheezing, rales or rhonchi  Cardiovascular: Regular, S1 and S2, No murmurs, No rubs, gallops or clicks  Gastrointestinal: Bowel Sounds present, soft, nontender.   Lymph: No peripheral edema. No lymphadenopathy.  Skin: No visible rashes or ulcers.  Psych:  Mood & affect appropriate    I&O's Summary      LABS: All Labs Reviewed:                        14.3   14.79 )-----------( 290      ( 01 Nov 2023 13:55 )             42.2     01 Nov 2023 13:55    138    |  105    |  24     ----------------------------<  136    4.9     |  28     |  1.00     Ca    9.6        01 Nov 2023 13:55  Mg     2.3       01 Nov 2023 13:55    TPro  7.8    /  Alb  3.9    /  TBili  0.5    /  DBili  x      /  AST  15     /  ALT  21     /  AlkPhos  93     01 Nov 2023 13:55    Troponin I, High Sensitivity Result: 6.3 ng/L (11-01-23 @ 13:55)      12 Lead ECG:   Ventricular Rate 81 BPM    Atrial Rate 81 BPM    P-R Interval 176 ms    QRS Duration 90 ms    Q-T Interval 382 ms    QTC Calculation(Bazett) 443 ms    P Axis 41 degrees    R Axis -56 degrees    T Axis 37 degrees    Diagnosis Line Normal sinus rhythm  Left anterior fascicular block  Left ventricular hypertrophy ( R in aVL , [place] product , Romhilt-Culp )  Abnormal ECG  No previous ECGs available  Confirmed by Timi Stockton MD ([id number]) on 11/1/2023 2:38:48 PM (11-01-23 @ 13:32)

## 2023-11-01 NOTE — ED ADULT TRIAGE NOTE - CHIEF COMPLAINT QUOTE
Patient A/Ox4 with clear speech. BIBA from urgent care for chest pain 4 days ago, syncope yesterday. Drove herself to urgent care, EKG was normal there and with EMS. BGL with . Denies head strike or AC therapy.

## 2023-11-01 NOTE — ED ADULT NURSE NOTE - OBJECTIVE STATEMENT
Pt states she had a syncopal episode today. Pt states that she does not remember what happened before fainting. Pt appears anxious. Pt in no acute distress. Pt updated on plan of care.

## 2023-11-01 NOTE — ED PROVIDER NOTE - OBJECTIVE STATEMENT
pt was sent from urgent this morning for evaluation episode of syncope this morning and episode of cp on Saturday night. pt states that she got out of the shower this morning and felt "very weird" and then passed out, denies having any pain or injuries. pt has cardiac history. denies having any cp now. states feels back to baseline now.

## 2023-11-01 NOTE — ED PROVIDER NOTE - CARE PROVIDER_API CALL
Timi Stockton  Cardiovascular Disease  [address] [place] [address]  Phone: [phone]  Fax: [phone]  Follow Up Time:

## 2023-11-01 NOTE — CONSULT NOTE ADULT - NS ATTEND AMEND GEN_ALL_CORE FT
episode of syncope in setting of warm shower  not clear if complete loss of consc as she seems to have been alert as she sat down, and did not collapse with injury  suspect hypotension, dehydration and vasodilation  recent chest discomfort which she thought was likely to be GI, days prior  needs outpt stress, echo, extended holter. lives on [address] and our office is too far, she will need to find a dr to consult  dc home is reasonable from my persp

## 2023-11-01 NOTE — ED PROVIDER NOTE - PATIENT PORTAL LINK FT
You can access the FollowMyHealth Patient Portal offered by [facility] by registering at the following website: [email or web address]. By joining [employer]’s FollowMyHealth portal, you will also be able to view your health information using other applications (apps) compatible with our system.

## 2023-11-01 NOTE — CONSULT NOTE ADULT - ASSESSMENT
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 She was taking a shower this morning, felt nausea after getting out of the shower, "blacked out" and woke up on the floor, and had chills after episode. No h/o syncopal episodes. She states she did not have symptoms prior to passing out other than feeling nauseous.   - Episode sounds vasovagal especially that the syncope occurred after warm shower.   - This cannot explain the chest pain on Saturday   - EKG showed SR @ 81, LAFB, LVH.   - Troponin HsI 6.3  - No evidence of any active ischemia   - She would need out patient stress test.     - No evidence of any meaningful volume overload   - BNP 54.     - BP stable and controlled   - Obtain orthostatics     - Monitor and replete lytes, keep K>4, Mg>2.  - Will continue to follow.    Nam Wellington, MS FNP, AGACNP  Nurse Practitioner- Cardiology   Please call on TEAMS     78y Female PMH Glaucoma, HTN, HLD, Bilateral hip replacement, DDD in cervical spine, Basal Cell Carcinoma/melanoma, s/p resection, lymph node dissection LUE s/p lymph node cancer and treatment 2011 p/w complaining of syncope     Syncope, HTN, HLD  - Pt had had constant substernal pain saturday night which then resolved. She was taking a shower this morning, felt nausea after getting out of the shower, "blacked out" and woke up on the floor, and had chills after episode. No h/o syncopal episodes. She states she did not have symptoms prior to passing out other than feeling nauseous.   - Episode sounds vasovagal especially that the syncope occurred after warm shower.   - This cannot explain the chest pain on Saturday   - EKG showed SR @ 81, LAFB, LVH.   - Troponin HsI 6.3  - No evidence of any active ischemia   - She would need out patient stress test.     - No evidence of any meaningful volume overload   - BNP 54.     - BP stable and controlled   - Obtain orthostatics     - Patient ok for discharge based on cardiovascular parameters, can follow up out patient   - Monitor and replete lytes, keep K>4, Mg>2.  - Will continue to follow.    Nam Wellington, MS FNP, AGACNP  Nurse Practitioner- Cardiology   Please call on TEAMS

## 2023-12-04 ENCOUNTER — APPOINTMENT (OUTPATIENT)
Dept: ORTHOPEDIC SURGERY | Facility: CLINIC | Age: 78
End: 2023-12-04
Payer: MEDICARE

## 2023-12-04 VITALS — BODY MASS INDEX: 28.17 KG/M2 | WEIGHT: 165 LBS | HEIGHT: 64 IN

## 2023-12-04 PROCEDURE — J3490M: CUSTOM | Mod: 50

## 2023-12-04 PROCEDURE — 73564 X-RAY EXAM KNEE 4 OR MORE: CPT | Mod: 50

## 2023-12-04 PROCEDURE — 20611 DRAIN/INJ JOINT/BURSA W/US: CPT | Mod: 50

## 2023-12-04 PROCEDURE — 99213 OFFICE O/P EST LOW 20 MIN: CPT | Mod: 25

## 2024-01-10 ENCOUNTER — APPOINTMENT (OUTPATIENT)
Dept: ORTHOPEDIC SURGERY | Facility: CLINIC | Age: 79
End: 2024-01-10
Payer: MEDICARE

## 2024-01-10 PROCEDURE — 72110 X-RAY EXAM L-2 SPINE 4/>VWS: CPT

## 2024-01-10 PROCEDURE — 99214 OFFICE O/P EST MOD 30 MIN: CPT

## 2024-01-10 RX ORDER — BACLOFEN 10 MG/1
10 TABLET ORAL 3 TIMES DAILY
Qty: 30 | Refills: 0 | Status: ACTIVE | COMMUNITY
Start: 2024-01-10 | End: 1900-01-01

## 2024-01-10 NOTE — ASSESSMENT
[FreeTextEntry1] : 78 F with low back pain and spasm. significant degen changes on x-ray PT  no nsaids due to easy bleeding tylenol  We will also prescribe Muscle Relaxants as needed.  Discussed side effects including but not limited to drowsiness and dizziness.  Discussed patient should not drive after taking the medicine. FU 6 weeks

## 2024-01-10 NOTE — IMAGING
[de-identified] : Spine: Inspection/Palpation: No tenderness to palpation throughout Cervical/thoracic/lumbar spine. except midline lumbar No bony stepoffs, No lesions.  Gait: antalgic, able to perform bilateral toe and heel rise.     Neurologic:  Bilateral Lower Extremities 5/5 Iliopsoas/Quadriceps/Hamstrings/ Tibialis Anterior/ Gastrocnemius. Extensor Hallucis Longus/ Flexor Hallucis Longus except    Sensation intact to light touch L2-S1  X-ray Ap/Lateral/Flexion/Extension of lumbar spine were viewed and interpreted.  Normal alignment is maintained without any spondylolisthesis.  diffuse spondylosis and disc height loss.

## 2024-01-10 NOTE — HISTORY OF PRESENT ILLNESS
[Lower back] : lower back [Dull/Aching] : dull/aching [Localized] : localized [Shooting] : shooting [Stabbing] : stabbing [de-identified] : 01/10/2024: 78 yr old female c/o lower back pain. No specific injury/trauma. Acute on chornic low back pain.  Has been wornseing over past few months.  PAin ius in lower back mdiline.  No radicular complitnats. nO numbness, weakness, or tingling. No bowel or bladder symptoms.  NO fevers or chills.  No NSAIDS due to easy bleeding  PMH: cervical spondylosis.   [] : no

## 2024-02-13 NOTE — OCCUPATIONAL THERAPY INITIAL EVALUATION ADULT - LEVEL OF CONSCIOUSNESS, OT EVAL
Consult - Urology   Mathew Rico 1949, 74 y.o. male MRN: [id number]    Unit/Bed#: ED-10 Encounter: [id number]    Assessment and Plan:  Chronic urinary retention with chronic SPT  - New 16 Fr catheter draining from SPT tract  - Recommend antibiotics due to multiple catheter attempts  - Follow up out patient for evaluation and cystoscopy  - Can upsize SPT as able during routine catheter exchanges    Subjective:   Mathew Rico is a 74 year old male with known history of chronic urinary retention managed with SPT. Patient was having SPT exchanged today and nursing unable to have SPT replaced. Patient was brought to ER for evaluation. Multiple attempts at SPT replacement as well as urethral dela cruz attempts were made by nursing and ER staff without success. Urology consulted for placement. Patient denies discomfort, feeling of needing to urinate, gross hematuria, fever, chills, nausea, vomiting.     Review of Systems   Constitutional:  Negative for activity change, appetite change, chills and fever.   HENT:  Negative for congestion and trouble swallowing.    Respiratory:  Negative for cough and shortness of breath.    Cardiovascular:  Negative for chest pain, palpitations and leg swelling.   Gastrointestinal:  Negative for abdominal pain, constipation, diarrhea, nausea and vomiting.   Genitourinary:  Positive for difficulty urinating. Negative for dysuria, flank pain, frequency, hematuria and urgency.   Musculoskeletal:  Negative for back pain and gait problem.   Skin:  Negative for wound.   Allergic/Immunologic: Negative for immunocompromised state.   Neurological:  Negative for dizziness and syncope.   Hematological:  Does not bruise/bleed easily.   Psychiatric/Behavioral:  Negative for confusion.    All other systems reviewed and are negative.      Objective:  Vitals: Blood pressure 129/59, pulse 80, temperature 97.9 °F (36.6 °C), temperature source Oral, resp. rate 18, weight 69 kg (152 lb 1.9 oz), SpO2  "96%.,Body mass index is 23.82 kg/m².    Physical Exam  Constitutional:       General: He is not in acute distress.     Appearance: Normal appearance. He is not ill-appearing, toxic-appearing or diaphoretic.   HENT:      Head: Normocephalic.      Nose: No congestion.   Eyes:      General: No scleral icterus.        Right eye: No discharge.         Left eye: No discharge.      Conjunctiva/sclera: Conjunctivae normal.      Pupils: Pupils are equal, round, and reactive to light.   Pulmonary:      Effort: Pulmonary effort is normal.   Musculoskeletal:      Cervical back: Normal range of motion.   Skin:     General: Skin is warm and dry.      Coloration: Skin is not jaundiced or pale.      Findings: No bruising, erythema, lesion or rash.   Neurological:      General: No focal deficit present.      Mental Status: He is alert and oriented to person, place, and time. Mental status is at baseline.      Gait: Gait normal.   Psychiatric:         Mood and Affect: Mood normal.         Behavior: Behavior normal.         Thought Content: Thought content normal.         Judgment: Judgment normal.         Imaging:  none  Imaging reviewed - both report and images personally reviewed.     Labs:  No results for input(s): \"WBC\" in the last 72 hours.  No results for input(s): \"HGB\" in the last 72 hours.  No results for input(s): \"CREATININE\" in the last 72 hours.    Microbiology:      History:  Social History     Socioeconomic History    Marital status: Single     Spouse name: None    Number of children: None    Years of education: None    Highest education level: None   Occupational History    Occupation:    retired   Tobacco Use    Smoking status: Former     Current packs/day: 0.00     Average packs/day: 0.5 packs/day for 31.0 years (15.5 ttl pk-yrs)     Types: Cigarettes     Start date:      Quit date:      Years since quittin.1    Smokeless tobacco: Never   Vaping Use    Vaping status: Never Used "   Substance and Sexual Activity    Alcohol use: Not Currently    Drug use: No    Sexual activity: Never   Other Topics Concern    None   Social History Narrative    None     Social Determinants of Health     Financial Resource Strain: Not on file   Food Insecurity: No Food Insecurity (12/15/2023)    Hunger Vital Sign     Worried About Running Out of Food in the Last Year: Never true     Ran Out of Food in the Last Year: Never true   Transportation Needs: No Transportation Needs (12/15/2023)    PRAPARE - Transportation     Lack of Transportation (Medical): No     Lack of Transportation (Non-Medical): No   Physical Activity: Not on file   Stress: Not on file   Social Connections: Not on file   Intimate Partner Violence: Not on file   Housing Stability: High Risk (12/15/2023)    Housing Stability Vital Sign     Unable to Pay for Housing in the Last Year: Yes     Number of Places Lived in the Last Year: 1     Unstable Housing in the Last Year: No       Past Medical History:   Diagnosis Date    Acute laryngitis     Acute nonsuppurative otitis media, unspecified laterality     Arm weakness     Arthritis     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[facility])     Bladder infection     Bronchitis     Constipation     Cough     Diabetes mellitus (HCC)     Dizziness     Dysfunction of eustachian tube     Erectile dysfunction of non-organic origin     Fatigue     Glaucoma     Hiatal hernia     Hypertension     Imbalance     Leg muscle spasm     MS (multiple sclerosis) (HCC)     Nephrolithiasis     Neurogenic bladder     No natural teeth     Sinus pain     Spinal stenosis     Strain of thoracic region     Stroke ([facility])     Suprapubic catheter ([facility])      Past Surgical History:   Procedure Laterality Date    APPENDECTOMY      BRAIN SURGERY      Coil placed in aneurysm    CYSTOSCOPY      CYSTOSCOPY      CYSTOSCOPY  06/11/2018    CYSTOSCOPY  01/15/2021    EYE SURGERY      transscleral cyclophotocoagulation noncontact YAG laser    MYRINGOTOMY      with  ventilation tube insertion    ID LITHOLAPAXY SMPL/SM <2.5 CM N/A 5/7/2019    Procedure: CYSTOSCOPY, holmium laser litholapaxy of bladder stones, EXCHANGE OF SP TUBE;  Surgeon: Javy Jeffries MD;  Location: [facility];  Service: Urology    SUPRAPUBIC CATHETER INSERTION       Family History   Problem Relation Age of Onset    Heart attack Mother     Stroke Mother     Heart attack Father     Anuerysm Father         In Stomach      BEDSIDE PROCEDURE  Indwelling Catheter PROCEDURE NOTE    Pre-operative Diagnosis: BPH, Urinary Retention        Post-operative Diagnosis: BPH, Urinary Retention & Diff Catheterization    INDICATION   73 y/o male with chronic urinary retention. Consultation requested to perform SPT Catheter Placement.     TIME OUT: Correct patient identity.    PROCEDURE   Consent: Patient was agreeable. Formal  Informed consent however, not applicable.    Under sterile conditions the patient was positioned. Betadine solution and sterile drapes were utilized.  Non- Petroleum based gel was used to lubricate SPT insertion site.  Utilized 16 FR latex Catheter. Urine was obtained without any difficulties and  Without evidence of hematuria or trauma.   Findings  300 ml of clear yellow urine was obtained.     Complications:  None; patient tolerated the procedure well.            Condition: stable    Plan  Maintain SPT to straight drainage.   Do not remove.   Flush BID using Valeri syringe and 120 ml NSS if needed  No further  intervention required.       Attending Attestation: Not Applicable        Katherin Khanna PA-C  Date: 2/12/2024 Time: 10:52 PM          alert

## 2024-02-21 ENCOUNTER — APPOINTMENT (OUTPATIENT)
Dept: ORTHOPEDIC SURGERY | Facility: CLINIC | Age: 79
End: 2024-02-21
Payer: MEDICARE

## 2024-02-21 PROCEDURE — 99213 OFFICE O/P EST LOW 20 MIN: CPT

## 2024-02-21 NOTE — IMAGING
[de-identified] : Spine: Inspection/Palpation: No tenderness to palpation throughout Cervical/thoracic/lumbar spine. except midline lumbar No bony stepoffs, No lesions.  Gait: antalgic, able to perform bilateral toe and heel rise.     Neurologic:  Bilateral Lower Extremities 5/5 Iliopsoas/Quadriceps/Hamstrings/ Tibialis Anterior/ Gastrocnemius. Extensor Hallucis Longus/ Flexor Hallucis Longus except    Sensation intact to light touch L2-S1  X-ray Ap/Lateral/Flexion/Extension of lumbar spine were viewed and interpreted.  Normal alignment is maintained without any spondylolisthesis.  diffuse spondylosis and disc height loss.

## 2024-02-21 NOTE — HISTORY OF PRESENT ILLNESS
[Lower back] : lower back [Dull/Aching] : dull/aching [Localized] : localized [Shooting] : shooting [Stabbing] : stabbing [de-identified] : 02/21/2024: pain has not improved.  Only been to 2 PT sessions  01/10/2024: 78 yr old female c/o lower back pain. No specific injury/trauma. Acute on chornic low back pain.  Has been wornseing over past few months.  Pain ius in lower back mdiline.  No radicular complitnats. nO numbness, weakness, or tingling. No bowel or bladder symptoms.  NO fevers or chills.  No NSAIDS due to easy bleeding  PMH: cervical spondylosis.   [] : no

## 2024-02-21 NOTE — ASSESSMENT
[FreeTextEntry1] : 78 F with low back pain and spasm. significant degen changes on x-ray. Pain has not improved with medications and 2 session fo PT PT  no nsaids due to easy bleeding Tylenol  We will also prescribe Muscle Relaxants as needed.  Discussed side effects including but not limited to drowsiness and dizziness.  Discussed patient should not drive after taking the medicine. MRI L spine to set up for possible injections FU after MRI

## 2024-02-27 ENCOUNTER — APPOINTMENT (OUTPATIENT)
Dept: MRI IMAGING | Facility: CLINIC | Age: 79
End: 2024-02-27
Payer: MEDICARE

## 2024-02-27 PROCEDURE — 72148 MRI LUMBAR SPINE W/O DYE: CPT

## 2024-02-28 DIAGNOSIS — M89.9 DISORDER OF BONE, UNSPECIFIED: ICD-10-CM

## 2024-03-04 ENCOUNTER — APPOINTMENT (OUTPATIENT)
Dept: CT IMAGING | Facility: CLINIC | Age: 79
End: 2024-03-04
Payer: MEDICARE

## 2024-03-04 PROCEDURE — 72131 CT LUMBAR SPINE W/O DYE: CPT

## 2024-03-06 ENCOUNTER — APPOINTMENT (OUTPATIENT)
Dept: ORTHOPEDIC SURGERY | Facility: CLINIC | Age: 79
End: 2024-03-06
Payer: MEDICARE

## 2024-03-06 PROCEDURE — 99213 OFFICE O/P EST LOW 20 MIN: CPT

## 2024-03-06 NOTE — ASSESSMENT
[FreeTextEntry1] : 78 F with low back pain and spasm. significant degen changes on x-ray. Pain has not improved with medications and  PT  no nsaids due to easy bleeding Tylenol Pain management  FU 6 weeks Consider repeat MRI in 65 months to assess atypical hemangioma in T12

## 2024-03-06 NOTE — HISTORY OF PRESENT ILLNESS
[Lower back] : lower back [Dull/Aching] : dull/aching [Localized] : localized [Shooting] : shooting [Stabbing] : stabbing [de-identified] : 03/06/2024: MRI follow up nd Ct followup.  02/21/2024: pain has not improved.  Only been to 2 PT sessions  01/10/2024: 78 yr old female c/o lower back pain. No specific injury/trauma. Acute on chronic low back pain.  Has been worsening over past few months.  Pain is in lower back midline.  No radicular complaints . No numbness, weakness, or tingling. No bowel or bladder symptoms.  NO fevers or chills.  No NSAIDS due to easy bleeding  PMH: cervical spondylosis.   [] : no

## 2024-03-06 NOTE — DATA REVIEWED
[MRI] : MRI [Lumbar Spine] : lumbar spine [CT Scan] : CT scan [I independently reviewed and interpreted images and report] : I independently reviewed and interpreted images and report [FreeTextEntry2] : Benign appearing lesion in T12 liekly h emangioma from CT findings  [FreeTextEntry1] : multilevel degenerative changes.  Possible atypical hemangioma in T12

## 2024-03-06 NOTE — IMAGING
[de-identified] : Spine: Inspection/Palpation: No tenderness to palpation throughout Cervical/thoracic/lumbar spine. except midline lumbar No bony stepoffs, No lesions.  Gait: antalgic, able to perform bilateral toe and heel rise.     Neurologic:  Bilateral Lower Extremities 5/5 Iliopsoas/Quadriceps/Hamstrings/ Tibialis Anterior/ Gastrocnemius. Extensor Hallucis Longus/ Flexor Hallucis Longus except    Sensation intact to light touch L2-S1  X-ray Ap/Lateral/Flexion/Extension of lumbar spine were viewed and interpreted.  Normal alignment is maintained without any spondylolisthesis.  diffuse spondylosis and disc height loss.

## 2024-03-25 PROBLEM — M47.816 DJD (DEGENERATIVE JOINT DISEASE), LUMBAR: Status: ACTIVE | Noted: 2017-03-17

## 2024-03-25 PROBLEM — M47.817 LUMBOSACRAL SPONDYLOSIS: Status: ACTIVE | Noted: 2017-12-11

## 2024-03-25 PROBLEM — M16.0 PRIMARY OSTEOARTHRITIS OF BOTH HIPS: Status: ACTIVE | Noted: 2017-02-28

## 2024-03-26 ENCOUNTER — APPOINTMENT (OUTPATIENT)
Dept: PAIN MANAGEMENT | Facility: CLINIC | Age: 79
End: 2024-03-26

## 2024-03-26 DIAGNOSIS — M16.0 BILATERAL PRIMARY OSTEOARTHRITIS OF HIP: ICD-10-CM

## 2024-03-26 DIAGNOSIS — M47.817 SPONDYLOSIS W/OUT MYELOPATHY OR RADICULOPATHY, LUMBOSACRAL REGION: ICD-10-CM

## 2024-03-26 DIAGNOSIS — M47.816 SPONDYLOSIS W/OUT MYELOPATHY OR RADICULOPATHY, LUMBAR REGION: ICD-10-CM

## 2024-03-27 NOTE — DISCUSSION/SUMMARY
[de-identified] : NAIDA REYES is a 78 year-old woman presenting for a NPV for a history of chronic low back pain with radicular features.

## 2024-03-27 NOTE — DATA REVIEWED
[MRI] : MRI [Lumbar Spine] : lumbar spine [Report was reviewed and noted in the chart] : The report was reviewed and noted in the chart [I independently reviewed and interpreted images and report] : I independently reviewed and interpreted images and report [FreeTextEntry1] : 2/27/2024: Nonspecific 2.1 x 2.3 cm low T1 and high T2 STIR signal lesion at the posterior aspect of T12. Could represent an atypical hemangioma, the possibility of osseous neoplasm cannot be completely excluded. L3-L4: diffuse annular disc bulge w/ facet arthropathy, causing mild central stenosis and mild inferior NF narrowing. L4-L5: diffuse annular bulge w/ facet arthropathy, mild central stenosis and bilateral NF narrowing.  L5-S1: diffuse annular disc bulge and mild facet arthropathy and endplate spurring.

## 2024-03-27 NOTE — HISTORY OF PRESENT ILLNESS
[FreeTextEntry1] : 3/26/2024: NAIDA REYES is a 78 year-old woman presenting for a NPV for a history of chronic low back pain with radicular features.    The patient states that average pain over the past week was /10 in severity. Mood: Sleep:   Prior treatment:

## 2024-04-01 NOTE — PATIENT PROFILE ADULT. - PT NEEDS ASSIST
yes RIGHT EAR WORSE THAN LEFT EAR/Mildly to Moderately Impaired: difficulty hearing in some environments or speaker may need to increase volume or speak distinctly

## 2024-04-12 ENCOUNTER — APPOINTMENT (OUTPATIENT)
Dept: ORTHOPEDIC SURGERY | Facility: CLINIC | Age: 79
End: 2024-04-12
Payer: MEDICARE

## 2024-04-12 VITALS — WEIGHT: 165 LBS | HEIGHT: 64 IN | BODY MASS INDEX: 28.17 KG/M2

## 2024-04-12 DIAGNOSIS — M17.11 UNILATERAL PRIMARY OSTEOARTHRITIS, RIGHT KNEE: ICD-10-CM

## 2024-04-12 DIAGNOSIS — M17.12 UNILATERAL PRIMARY OSTEOARTHRITIS, LEFT KNEE: ICD-10-CM

## 2024-04-12 PROCEDURE — J3490M: CUSTOM | Mod: 50

## 2024-04-12 PROCEDURE — 99213 OFFICE O/P EST LOW 20 MIN: CPT | Mod: 25

## 2024-04-12 PROCEDURE — 20610 DRAIN/INJ JOINT/BURSA W/O US: CPT | Mod: 50

## 2024-04-12 NOTE — HISTORY OF PRESENT ILLNESS
[Right Leg] : right leg [Gradual] : gradual [5] : 5 [Localized] : localized [Intermittent] : intermittent [Household chores] : household chores [Rest] : rest [Walking] : walking [8] : 8 [de-identified] : 11/28/22 bl knees orthovisc injections, #2 12/2/22: ORTHOVISC #2 B/L KNEES 12/9/22: orthovisc #4 b/l knees  03/31/2023 here to review mri results on the right knee  08/21/23 follow up bl knees finished orthovisc with little relief   12/4/23: here to follow up on bilateral knees. Zilretta from 8/21/23 gave relief for 2 months. Inquiring about repeat. Right knee continues to be worse than left. Pain increases with stairs/bending. Takes Tylenol PRN.   04/12/2024: Follow up bilateral knees. She had good relief with Zilretta injections, but symptoms have returned. No new injury. [] : Post Surgical Visit: no [FreeTextEntry1] : knee [FreeTextEntry5] : pt states pain is worse since last visit. no new injury.  [de-identified] : mri done at [facility] [de-identified] : orthovisc injections

## 2024-04-12 NOTE — ASSESSMENT
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  Good relief with Zilretta series over 3 months ago.  Repeat injection today at her request.  Not interested in TKA due to living by herself.

## 2024-04-12 NOTE — IMAGING
[de-identified] : Valgus deformity No effusion, no warmth, no ecchymosis Medial joint line tenderness to palpation  Range of motion 0-100 with significant anterior crepitus 5/5 quadriceps and hamstring strength Ligamentously stable Motor and sensory intact distally Non antalgic gait Negative Carmela

## 2024-04-17 ENCOUNTER — APPOINTMENT (OUTPATIENT)
Dept: ORTHOPEDIC SURGERY | Facility: CLINIC | Age: 79
End: 2024-04-17

## 2024-04-20 ENCOUNTER — NON-APPOINTMENT (OUTPATIENT)
Age: 79
End: 2024-04-20

## 2024-04-24 ENCOUNTER — NON-APPOINTMENT (OUTPATIENT)
Age: 79
End: 2024-04-24

## 2024-06-11 ENCOUNTER — RX ONLY (RX ONLY)
Age: 79
End: 2024-06-11

## 2024-06-11 ENCOUNTER — OFFICE (OUTPATIENT)
Dept: URBAN - METROPOLITAN AREA CLINIC 109 | Facility: CLINIC | Age: 79
Setting detail: OPHTHALMOLOGY
End: 2024-06-11
Payer: MEDICARE

## 2024-06-11 DIAGNOSIS — H02.135: ICD-10-CM

## 2024-06-11 DIAGNOSIS — H01.135: ICD-10-CM

## 2024-06-11 DIAGNOSIS — H01.132: ICD-10-CM

## 2024-06-11 DIAGNOSIS — H01.134: ICD-10-CM

## 2024-06-11 DIAGNOSIS — H40.1131: ICD-10-CM

## 2024-06-11 DIAGNOSIS — H02.132: ICD-10-CM

## 2024-06-11 DIAGNOSIS — H01.002: ICD-10-CM

## 2024-06-11 DIAGNOSIS — H01.131: ICD-10-CM

## 2024-06-11 DIAGNOSIS — L25.9: ICD-10-CM

## 2024-06-11 DIAGNOSIS — H01.005: ICD-10-CM

## 2024-06-11 PROCEDURE — 92002 INTRM OPH EXAM NEW PATIENT: CPT | Performed by: OPHTHALMOLOGY

## 2024-06-11 ASSESSMENT — LID EXAM ASSESSMENTS
OD_BLEPHARITIS: RLL 1+
OS_BLEPHARITIS: LLL 1+

## 2024-06-11 ASSESSMENT — CONFRONTATIONAL VISUAL FIELD TEST (CVF)
OS_FINDINGS: FULL
OD_FINDINGS: FULL

## 2024-06-11 ASSESSMENT — LID POSITION - ECTROPION
OS_ECTROPION: LLL T 1+
OD_ECTROPION: RLL T 1+

## 2024-08-09 ENCOUNTER — APPOINTMENT (OUTPATIENT)
Dept: ORTHOPEDIC SURGERY | Facility: CLINIC | Age: 79
End: 2024-08-09

## 2024-08-09 PROCEDURE — 20610 DRAIN/INJ JOINT/BURSA W/O US: CPT | Mod: 50

## 2024-08-09 PROCEDURE — J3490M: CUSTOM | Mod: JZ

## 2024-08-09 NOTE — ASSESSMENT
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   Repeat injection today at her request.  Not interested in TKA due to living by herself.

## 2024-08-09 NOTE — HISTORY OF PRESENT ILLNESS
[Right Leg] : right leg [Gradual] : gradual [8] : 8 [5] : 5 [Localized] : localized [Intermittent] : intermittent [Household chores] : household chores [Rest] : rest [Walking] : walking [de-identified] : 11/28/22 bl knees orthovisc injections, #2 12/2/22: ORTHOVISC #2 B/L KNEES 12/9/22: orthovisc #4 b/l knees  03/31/2023 here to review mri results on the right knee  08/21/23 follow up bl knees finished orthovisc with little relief   12/4/23: here to follow up on bilateral knees. Zilretta from 8/21/23 gave relief for 2 months. Inquiring about repeat. Right knee continues to be worse than left. Pain increases with stairs/bending. Takes Tylenol PRN.   04/12/2024: Follow up bilateral knees. She had good relief with Zilretta injections, but symptoms have returned. No new injury.  8/9/24: here to follow up on bilateral knee. Zilretta from 4/12/24 gave relief for 3 months. Inquiring about repeat. Increased pain with bending/walking.  [] : Post Surgical Visit: no [FreeTextEntry1] : knee [FreeTextEntry5] : pt states pain is worse since last visit. no new injury.  [de-identified] : mri done at [facility] [de-identified] : orthovisc injections

## 2024-08-09 NOTE — IMAGING
[de-identified] : Valgus deformity No effusion, no warmth, no ecchymosis Medial joint line tenderness to palpation  Range of motion 0-100 with significant anterior crepitus 5/5 quadriceps and hamstring strength Ligamentously stable Motor and sensory intact distally Non antalgic gait Negative Carmela

## 2024-10-17 ENCOUNTER — APPOINTMENT (OUTPATIENT)
Dept: RADIOLOGY | Facility: CLINIC | Age: 79
End: 2024-10-17
Payer: MEDICARE

## 2024-10-17 PROCEDURE — 73564 X-RAY EXAM KNEE 4 OR MORE: CPT | Mod: 50

## 2024-10-17 PROCEDURE — 73590 X-RAY EXAM OF LOWER LEG: CPT | Mod: LT

## 2024-10-18 ENCOUNTER — APPOINTMENT (OUTPATIENT)
Dept: ORTHOPEDIC SURGERY | Facility: CLINIC | Age: 79
End: 2024-10-18
Payer: MEDICARE

## 2024-10-18 DIAGNOSIS — S80.11XA CONTUSION OF RIGHT LOWER LEG, INITIAL ENCOUNTER: ICD-10-CM

## 2024-10-18 DIAGNOSIS — M17.11 UNILATERAL PRIMARY OSTEOARTHRITIS, RIGHT KNEE: ICD-10-CM

## 2024-10-18 DIAGNOSIS — S80.12XA CONTUSION OF LEFT LOWER LEG, INITIAL ENCOUNTER: ICD-10-CM

## 2024-10-18 PROCEDURE — 99213 OFFICE O/P EST LOW 20 MIN: CPT

## 2024-11-18 ENCOUNTER — APPOINTMENT (OUTPATIENT)
Dept: ORTHOPEDIC SURGERY | Facility: CLINIC | Age: 79
End: 2024-11-18

## 2024-11-18 PROCEDURE — 99213 OFFICE O/P EST LOW 20 MIN: CPT

## 2024-11-25 ENCOUNTER — APPOINTMENT (OUTPATIENT)
Dept: ORTHOPEDIC SURGERY | Facility: CLINIC | Age: 79
End: 2024-11-25
Payer: MEDICARE

## 2024-11-25 DIAGNOSIS — M17.11 UNILATERAL PRIMARY OSTEOARTHRITIS, RIGHT KNEE: ICD-10-CM

## 2024-11-25 DIAGNOSIS — M17.12 UNILATERAL PRIMARY OSTEOARTHRITIS, LEFT KNEE: ICD-10-CM

## 2024-11-25 PROCEDURE — J3490M: CUSTOM | Mod: JZ

## 2024-11-25 PROCEDURE — 20610 DRAIN/INJ JOINT/BURSA W/O US: CPT | Mod: 50

## 2024-12-30 RX ORDER — HYALURONATE SODIUM 20 MG/2 ML
20 SYRINGE (ML) INTRAARTICULAR
Qty: 6 | Refills: 0 | Status: ACTIVE | COMMUNITY
Start: 2024-12-30 | End: 1900-01-01

## 2025-01-17 ENCOUNTER — APPOINTMENT (OUTPATIENT)
Dept: ORTHOPEDIC SURGERY | Facility: CLINIC | Age: 80
End: 2025-01-17
Payer: MEDICARE

## 2025-01-17 PROCEDURE — 20610 DRAIN/INJ JOINT/BURSA W/O US: CPT | Mod: 50

## 2025-01-24 ENCOUNTER — APPOINTMENT (OUTPATIENT)
Dept: ORTHOPEDIC SURGERY | Facility: CLINIC | Age: 80
End: 2025-01-24
Payer: MEDICARE

## 2025-01-24 VITALS — WEIGHT: 165 LBS | BODY MASS INDEX: 28.17 KG/M2 | HEIGHT: 64 IN

## 2025-01-24 PROCEDURE — 20610 DRAIN/INJ JOINT/BURSA W/O US: CPT | Mod: LT

## 2025-01-31 ENCOUNTER — APPOINTMENT (OUTPATIENT)
Dept: ORTHOPEDIC SURGERY | Facility: CLINIC | Age: 80
End: 2025-01-31
Payer: MEDICARE

## 2025-01-31 VITALS — BODY MASS INDEX: 28.17 KG/M2 | WEIGHT: 165 LBS | HEIGHT: 64 IN

## 2025-01-31 DIAGNOSIS — M17.11 UNILATERAL PRIMARY OSTEOARTHRITIS, RIGHT KNEE: ICD-10-CM

## 2025-01-31 DIAGNOSIS — M17.12 UNILATERAL PRIMARY OSTEOARTHRITIS, LEFT KNEE: ICD-10-CM

## 2025-01-31 PROCEDURE — 20611 DRAIN/INJ JOINT/BURSA W/US: CPT | Mod: RT

## 2025-02-07 ENCOUNTER — APPOINTMENT (OUTPATIENT)
Dept: ORTHOPEDIC SURGERY | Facility: CLINIC | Age: 80
End: 2025-02-07

## 2025-02-21 ENCOUNTER — APPOINTMENT (OUTPATIENT)
Dept: ORTHOPEDIC SURGERY | Facility: CLINIC | Age: 80
End: 2025-02-21
Payer: SELF-PAY

## 2025-02-21 VITALS — BODY MASS INDEX: 28.17 KG/M2 | WEIGHT: 165 LBS | HEIGHT: 64 IN

## 2025-02-21 DIAGNOSIS — M17.11 UNILATERAL PRIMARY OSTEOARTHRITIS, RIGHT KNEE: ICD-10-CM

## 2025-02-21 PROCEDURE — 20610 DRAIN/INJ JOINT/BURSA W/O US: CPT

## 2025-03-04 ENCOUNTER — APPOINTMENT (OUTPATIENT)
Dept: ORTHOPEDIC SURGERY | Facility: CLINIC | Age: 80
End: 2025-03-04
Payer: MEDICARE

## 2025-03-04 VITALS — BODY MASS INDEX: 24.75 KG/M2 | HEIGHT: 64 IN | WEIGHT: 145 LBS

## 2025-03-04 DIAGNOSIS — M17.11 UNILATERAL PRIMARY OSTEOARTHRITIS, RIGHT KNEE: ICD-10-CM

## 2025-03-04 DIAGNOSIS — Z86.79 PERSONAL HISTORY OF OTHER DISEASES OF THE CIRCULATORY SYSTEM: ICD-10-CM

## 2025-03-04 DIAGNOSIS — M17.12 UNILATERAL PRIMARY OSTEOARTHRITIS, LEFT KNEE: ICD-10-CM

## 2025-03-04 PROCEDURE — G2211 COMPLEX E/M VISIT ADD ON: CPT

## 2025-03-04 PROCEDURE — 73564 X-RAY EXAM KNEE 4 OR MORE: CPT | Mod: 50

## 2025-03-04 PROCEDURE — 99214 OFFICE O/P EST MOD 30 MIN: CPT

## 2025-04-04 ENCOUNTER — APPOINTMENT (OUTPATIENT)
Dept: ORTHOPEDIC SURGERY | Facility: CLINIC | Age: 80
End: 2025-04-04
Payer: MEDICARE

## 2025-04-04 DIAGNOSIS — M17.12 UNILATERAL PRIMARY OSTEOARTHRITIS, LEFT KNEE: ICD-10-CM

## 2025-04-04 PROCEDURE — 20610 DRAIN/INJ JOINT/BURSA W/O US: CPT | Mod: LT

## 2025-04-04 PROCEDURE — J3490M: CUSTOM | Mod: JZ

## 2025-04-04 PROCEDURE — 99213 OFFICE O/P EST LOW 20 MIN: CPT | Mod: 25

## 2025-05-19 ENCOUNTER — APPOINTMENT (OUTPATIENT)
Dept: ORTHOPEDIC SURGERY | Facility: CLINIC | Age: 80
End: 2025-05-19
Payer: MEDICARE

## 2025-05-19 DIAGNOSIS — M19.049 PRIMARY OSTEOARTHRITIS, UNSPECIFIED HAND: ICD-10-CM

## 2025-05-19 PROCEDURE — 20605 DRAIN/INJ JOINT/BURSA W/O US: CPT | Mod: RT

## 2025-05-19 PROCEDURE — 99214 OFFICE O/P EST MOD 30 MIN: CPT | Mod: 25

## 2025-05-19 PROCEDURE — J3490M: CUSTOM

## 2025-05-19 PROCEDURE — 73130 X-RAY EXAM OF HAND: CPT | Mod: RT

## 2025-06-09 ENCOUNTER — APPOINTMENT (OUTPATIENT)
Dept: ORTHOPEDIC SURGERY | Facility: CLINIC | Age: 80
End: 2025-06-09

## 2025-06-13 ENCOUNTER — APPOINTMENT (OUTPATIENT)
Dept: ORTHOPEDIC SURGERY | Facility: CLINIC | Age: 80
End: 2025-06-13
Payer: MEDICARE

## 2025-06-13 PROCEDURE — 20610 DRAIN/INJ JOINT/BURSA W/O US: CPT | Mod: RT

## 2025-06-13 PROCEDURE — J3490M: CUSTOM

## 2025-07-30 NOTE — PRE-OP CHECKLIST - ISOLATION PRECAUTIONS
Check sugar every morning and drink plenty of water  Return in 3 months  Send me sugars and blood pressure weekly; if /80 or higher in the evening, then take amlodipine 5. Otherwise we can see how the Losartan 100 and Metoprolol do.  Call  for colonoscopy  Screening mammogram   Stop smoking  [facility] in [place]   none

## 2025-08-22 ENCOUNTER — APPOINTMENT (OUTPATIENT)
Dept: ORTHOPEDIC SURGERY | Facility: CLINIC | Age: 80
End: 2025-08-22
Payer: SELF-PAY

## 2025-08-22 DIAGNOSIS — M17.12 UNILATERAL PRIMARY OSTEOARTHRITIS, LEFT KNEE: ICD-10-CM

## 2025-08-22 DIAGNOSIS — M17.11 UNILATERAL PRIMARY OSTEOARTHRITIS, RIGHT KNEE: ICD-10-CM

## 2025-08-22 PROCEDURE — J3490M: CUSTOM

## 2025-08-22 PROCEDURE — 20610 DRAIN/INJ JOINT/BURSA W/O US: CPT | Mod: LT

## 2025-09-04 ENCOUNTER — APPOINTMENT (OUTPATIENT)
Dept: ORTHOPEDIC SURGERY | Facility: CLINIC | Age: 80
End: 2025-09-04
Payer: MEDICARE

## 2025-09-04 VITALS — HEIGHT: 64 IN | BODY MASS INDEX: 24.75 KG/M2 | WEIGHT: 145 LBS

## 2025-09-04 DIAGNOSIS — M19.041 PRIMARY OSTEOARTHRITIS, RIGHT HAND: ICD-10-CM

## 2025-09-04 DIAGNOSIS — M18.12 UNILATERAL PRIMARY OSTEOARTHRITIS OF FIRST CARPOMETACARPAL JOINT, LEFT HAND: ICD-10-CM

## 2025-09-04 DIAGNOSIS — M19.042 PRIMARY OSTEOARTHRITIS, LEFT HAND: ICD-10-CM

## 2025-09-04 DIAGNOSIS — M65.311 TRIGGER THUMB, RIGHT THUMB: ICD-10-CM

## 2025-09-04 DIAGNOSIS — M18.11 UNILATERAL PRIMARY OSTEOARTHRITIS OF FIRST CARPOMETACARPAL JOINT, RIGHT HAND: ICD-10-CM

## 2025-09-04 PROCEDURE — 20550 NJX 1 TENDON SHEATH/LIGAMENT: CPT | Mod: F5

## 2025-09-04 PROCEDURE — 99205 OFFICE O/P NEW HI 60 MIN: CPT | Mod: 25
